# Patient Record
Sex: FEMALE | Race: OTHER | HISPANIC OR LATINO | URBAN - METROPOLITAN AREA
[De-identification: names, ages, dates, MRNs, and addresses within clinical notes are randomized per-mention and may not be internally consistent; named-entity substitution may affect disease eponyms.]

---

## 2021-07-27 ENCOUNTER — INPATIENT (INPATIENT)
Facility: HOSPITAL | Age: 71
LOS: 1 days | Discharge: HOME | DRG: 909 | End: 2021-07-29
Attending: HOSPITALIST | Admitting: HOSPITALIST
Payer: COMMERCIAL

## 2021-07-27 ENCOUNTER — TRANSCRIPTION ENCOUNTER (OUTPATIENT)
Age: 71
End: 2021-07-27

## 2021-07-27 VITALS
RESPIRATION RATE: 18 BRPM | WEIGHT: 163.14 LBS | TEMPERATURE: 98 F | HEIGHT: 62 IN | DIASTOLIC BLOOD PRESSURE: 82 MMHG | OXYGEN SATURATION: 97 % | SYSTOLIC BLOOD PRESSURE: 135 MMHG | HEART RATE: 93 BPM

## 2021-07-27 DIAGNOSIS — Z90.710 ACQUIRED ABSENCE OF BOTH CERVIX AND UTERUS: Chronic | ICD-10-CM

## 2021-07-27 DIAGNOSIS — S90.859A SUPERFICIAL FOREIGN BODY, UNSPECIFIED FOOT, INITIAL ENCOUNTER: ICD-10-CM

## 2021-07-27 DIAGNOSIS — I10 ESSENTIAL (PRIMARY) HYPERTENSION: ICD-10-CM

## 2021-07-27 DIAGNOSIS — Z01.818 ENCOUNTER FOR OTHER PREPROCEDURAL EXAMINATION: ICD-10-CM

## 2021-07-27 DIAGNOSIS — I25.10 ATHEROSCLEROTIC HEART DISEASE OF NATIVE CORONARY ARTERY WITHOUT ANGINA PECTORIS: ICD-10-CM

## 2021-07-27 DIAGNOSIS — Z71.89 OTHER SPECIFIED COUNSELING: ICD-10-CM

## 2021-07-27 DIAGNOSIS — E11.9 TYPE 2 DIABETES MELLITUS WITHOUT COMPLICATIONS: ICD-10-CM

## 2021-07-27 DIAGNOSIS — Z29.9 ENCOUNTER FOR PROPHYLACTIC MEASURES, UNSPECIFIED: ICD-10-CM

## 2021-07-27 LAB
ALBUMIN SERPL ELPH-MCNC: 3.9 G/DL — SIGNIFICANT CHANGE UP (ref 3.5–5)
ALP SERPL-CCNC: 114 U/L — SIGNIFICANT CHANGE UP (ref 40–120)
ALT FLD-CCNC: 25 U/L DA — SIGNIFICANT CHANGE UP (ref 10–60)
ANION GAP SERPL CALC-SCNC: 6 MMOL/L — SIGNIFICANT CHANGE UP (ref 5–17)
APTT BLD: 33.2 SEC — SIGNIFICANT CHANGE UP (ref 27.5–35.5)
AST SERPL-CCNC: 16 U/L — SIGNIFICANT CHANGE UP (ref 10–40)
BASOPHILS # BLD AUTO: 0.04 K/UL — SIGNIFICANT CHANGE UP (ref 0–0.2)
BASOPHILS NFR BLD AUTO: 0.5 % — SIGNIFICANT CHANGE UP (ref 0–2)
BILIRUB SERPL-MCNC: 0.3 MG/DL — SIGNIFICANT CHANGE UP (ref 0.2–1.2)
BLD GP AB SCN SERPL QL: SIGNIFICANT CHANGE UP
BUN SERPL-MCNC: 21 MG/DL — HIGH (ref 7–18)
CALCIUM SERPL-MCNC: 9.2 MG/DL — SIGNIFICANT CHANGE UP (ref 8.4–10.5)
CHLORIDE SERPL-SCNC: 105 MMOL/L — SIGNIFICANT CHANGE UP (ref 96–108)
CO2 SERPL-SCNC: 27 MMOL/L — SIGNIFICANT CHANGE UP (ref 22–31)
CREAT SERPL-MCNC: 1.2 MG/DL — SIGNIFICANT CHANGE UP (ref 0.5–1.3)
EOSINOPHIL # BLD AUTO: 0.18 K/UL — SIGNIFICANT CHANGE UP (ref 0–0.5)
EOSINOPHIL NFR BLD AUTO: 2.3 % — SIGNIFICANT CHANGE UP (ref 0–6)
GLUCOSE BLDC GLUCOMTR-MCNC: 118 MG/DL — HIGH (ref 70–99)
GLUCOSE BLDC GLUCOMTR-MCNC: 240 MG/DL — HIGH (ref 70–99)
GLUCOSE SERPL-MCNC: 236 MG/DL — HIGH (ref 70–99)
HCT VFR BLD CALC: 37 % — SIGNIFICANT CHANGE UP (ref 34.5–45)
HGB BLD-MCNC: 12.2 G/DL — SIGNIFICANT CHANGE UP (ref 11.5–15.5)
IMM GRANULOCYTES NFR BLD AUTO: 0.3 % — SIGNIFICANT CHANGE UP (ref 0–1.5)
INR BLD: 1.11 RATIO — SIGNIFICANT CHANGE UP (ref 0.88–1.16)
LYMPHOCYTES # BLD AUTO: 2.26 K/UL — SIGNIFICANT CHANGE UP (ref 1–3.3)
LYMPHOCYTES # BLD AUTO: 28.8 % — SIGNIFICANT CHANGE UP (ref 13–44)
MCHC RBC-ENTMCNC: 30.5 PG — SIGNIFICANT CHANGE UP (ref 27–34)
MCHC RBC-ENTMCNC: 33 GM/DL — SIGNIFICANT CHANGE UP (ref 32–36)
MCV RBC AUTO: 92.5 FL — SIGNIFICANT CHANGE UP (ref 80–100)
MONOCYTES # BLD AUTO: 0.52 K/UL — SIGNIFICANT CHANGE UP (ref 0–0.9)
MONOCYTES NFR BLD AUTO: 6.6 % — SIGNIFICANT CHANGE UP (ref 2–14)
NEUTROPHILS # BLD AUTO: 4.82 K/UL — SIGNIFICANT CHANGE UP (ref 1.8–7.4)
NEUTROPHILS NFR BLD AUTO: 61.5 % — SIGNIFICANT CHANGE UP (ref 43–77)
NRBC # BLD: 0 /100 WBCS — SIGNIFICANT CHANGE UP (ref 0–0)
PLATELET # BLD AUTO: 271 K/UL — SIGNIFICANT CHANGE UP (ref 150–400)
POTASSIUM SERPL-MCNC: 4.3 MMOL/L — SIGNIFICANT CHANGE UP (ref 3.5–5.3)
POTASSIUM SERPL-SCNC: 4.3 MMOL/L — SIGNIFICANT CHANGE UP (ref 3.5–5.3)
PROT SERPL-MCNC: 8.7 G/DL — HIGH (ref 6–8.3)
PROTHROM AB SERPL-ACNC: 13.1 SEC — SIGNIFICANT CHANGE UP (ref 10.6–13.6)
RBC # BLD: 4 M/UL — SIGNIFICANT CHANGE UP (ref 3.8–5.2)
RBC # FLD: 12.5 % — SIGNIFICANT CHANGE UP (ref 10.3–14.5)
SARS-COV-2 RNA SPEC QL NAA+PROBE: SIGNIFICANT CHANGE UP
SODIUM SERPL-SCNC: 138 MMOL/L — SIGNIFICANT CHANGE UP (ref 135–145)
WBC # BLD: 7.84 K/UL — SIGNIFICANT CHANGE UP (ref 3.8–10.5)
WBC # FLD AUTO: 7.84 K/UL — SIGNIFICANT CHANGE UP (ref 3.8–10.5)

## 2021-07-27 PROCEDURE — 99223 1ST HOSP IP/OBS HIGH 75: CPT | Mod: GC

## 2021-07-27 PROCEDURE — 73660 X-RAY EXAM OF TOE(S): CPT | Mod: 26,RT

## 2021-07-27 PROCEDURE — 99285 EMERGENCY DEPT VISIT HI MDM: CPT

## 2021-07-27 PROCEDURE — 71045 X-RAY EXAM CHEST 1 VIEW: CPT | Mod: 26

## 2021-07-27 RX ORDER — OXYCODONE AND ACETAMINOPHEN 5; 325 MG/1; MG/1
1 TABLET ORAL EVERY 6 HOURS
Refills: 0 | Status: DISCONTINUED | OUTPATIENT
Start: 2021-07-27 | End: 2021-07-29

## 2021-07-27 RX ORDER — LIDOCAINE HCL 20 MG/ML
20 VIAL (ML) INJECTION ONCE
Refills: 0 | Status: COMPLETED | OUTPATIENT
Start: 2021-07-27 | End: 2021-07-27

## 2021-07-27 RX ORDER — CEFAZOLIN SODIUM 1 G
1000 VIAL (EA) INJECTION ONCE
Refills: 0 | Status: COMPLETED | OUTPATIENT
Start: 2021-07-27 | End: 2021-07-27

## 2021-07-27 RX ORDER — INSULIN LISPRO 100/ML
VIAL (ML) SUBCUTANEOUS
Refills: 0 | Status: DISCONTINUED | OUTPATIENT
Start: 2021-07-27 | End: 2021-07-29

## 2021-07-27 RX ORDER — SIMVASTATIN 20 MG/1
20 TABLET, FILM COATED ORAL AT BEDTIME
Refills: 0 | Status: DISCONTINUED | OUTPATIENT
Start: 2021-07-27 | End: 2021-07-29

## 2021-07-27 RX ORDER — METOPROLOL TARTRATE 50 MG
25 TABLET ORAL DAILY
Refills: 0 | Status: DISCONTINUED | OUTPATIENT
Start: 2021-07-27 | End: 2021-07-29

## 2021-07-27 RX ORDER — GABAPENTIN 400 MG/1
300 CAPSULE ORAL DAILY
Refills: 0 | Status: DISCONTINUED | OUTPATIENT
Start: 2021-07-27 | End: 2021-07-29

## 2021-07-27 RX ORDER — INSULIN GLARGINE 100 [IU]/ML
12 INJECTION, SOLUTION SUBCUTANEOUS AT BEDTIME
Refills: 0 | Status: DISCONTINUED | OUTPATIENT
Start: 2021-07-27 | End: 2021-07-29

## 2021-07-27 RX ORDER — SODIUM CHLORIDE 9 MG/ML
1000 INJECTION INTRAMUSCULAR; INTRAVENOUS; SUBCUTANEOUS
Refills: 0 | Status: DISCONTINUED | OUTPATIENT
Start: 2021-07-27 | End: 2021-07-29

## 2021-07-27 RX ORDER — INSULIN LISPRO 100/ML
8 VIAL (ML) SUBCUTANEOUS ONCE
Refills: 0 | Status: DISCONTINUED | OUTPATIENT
Start: 2021-07-27 | End: 2021-07-28

## 2021-07-27 RX ORDER — TETANUS TOXOID, REDUCED DIPHTHERIA TOXOID AND ACELLULAR PERTUSSIS VACCINE, ADSORBED 5; 2.5; 8; 8; 2.5 [IU]/.5ML; [IU]/.5ML; UG/.5ML; UG/.5ML; UG/.5ML
0.5 SUSPENSION INTRAMUSCULAR ONCE
Refills: 0 | Status: COMPLETED | OUTPATIENT
Start: 2021-07-27 | End: 2021-07-27

## 2021-07-27 RX ORDER — ACETAMINOPHEN 500 MG
650 TABLET ORAL EVERY 6 HOURS
Refills: 0 | Status: DISCONTINUED | OUTPATIENT
Start: 2021-07-27 | End: 2021-07-29

## 2021-07-27 RX ORDER — ASPIRIN/CALCIUM CARB/MAGNESIUM 324 MG
81 TABLET ORAL DAILY
Refills: 0 | Status: DISCONTINUED | OUTPATIENT
Start: 2021-07-27 | End: 2021-07-29

## 2021-07-27 RX ORDER — LANOLIN ALCOHOL/MO/W.PET/CERES
3 CREAM (GRAM) TOPICAL AT BEDTIME
Refills: 0 | Status: DISCONTINUED | OUTPATIENT
Start: 2021-07-27 | End: 2021-07-29

## 2021-07-27 RX ORDER — CEFAZOLIN SODIUM 1 G
500 VIAL (EA) INJECTION EVERY 8 HOURS
Refills: 0 | Status: DISCONTINUED | OUTPATIENT
Start: 2021-07-27 | End: 2021-07-29

## 2021-07-27 RX ADMIN — Medication 20 MILLILITER(S): at 12:13

## 2021-07-27 RX ADMIN — Medication 1000 MILLIGRAM(S): at 14:30

## 2021-07-27 RX ADMIN — SODIUM CHLORIDE 75 MILLILITER(S): 9 INJECTION INTRAMUSCULAR; INTRAVENOUS; SUBCUTANEOUS at 20:35

## 2021-07-27 RX ADMIN — INSULIN GLARGINE 12 UNIT(S): 100 INJECTION, SOLUTION SUBCUTANEOUS at 23:00

## 2021-07-27 RX ADMIN — Medication 100 MILLIGRAM(S): at 23:00

## 2021-07-27 RX ADMIN — Medication 0: at 20:25

## 2021-07-27 RX ADMIN — GABAPENTIN 300 MILLIGRAM(S): 400 CAPSULE ORAL at 20:33

## 2021-07-27 RX ADMIN — TETANUS TOXOID, REDUCED DIPHTHERIA TOXOID AND ACELLULAR PERTUSSIS VACCINE, ADSORBED 0.5 MILLILITER(S): 5; 2.5; 8; 8; 2.5 SUSPENSION INTRAMUSCULAR at 13:34

## 2021-07-27 RX ADMIN — Medication 3 MILLIGRAM(S): at 23:00

## 2021-07-27 RX ADMIN — SIMVASTATIN 20 MILLIGRAM(S): 20 TABLET, FILM COATED ORAL at 23:21

## 2021-07-27 RX ADMIN — OXYCODONE AND ACETAMINOPHEN 1 TABLET(S): 5; 325 TABLET ORAL at 20:33

## 2021-07-27 RX ADMIN — Medication 100 MILLIGRAM(S): at 13:33

## 2021-07-27 NOTE — H&P ADULT - ATTENDING COMMENTS
69 y/o female with a PMH of DM type 2, HTN, CAD s/p stent 11 years ago, who presented with right foot pain after stepping on a sewing needle on Wednesday. She reports that she attempted to remove it herself but was unable to, then went to a podiatrist who was also unsuccessful, then went to [facility] where it also could not be removed, She was prescribed antibiotics with clindamycin and ciprofloxacin yesterday. Due to the ongoing pain and difficulty with ambulation she decided to come to [facility]. She was seen by podiatry in the ED, unable to remove the needle, therefore planning on taking patient to the OR tomorrow at 3:30 pm. Patient reports good ET, denies signs/symptoms of CHF, has CAD but no prior h/o MI or CVA.     Vital Signs Last 24 Hrs  T(C): 36.4 (27 Jul 2021 15:46), Max: 36.8 (27 Jul 2021 10:30)  T(F): 97.5 (27 Jul 2021 15:46), Max: 98.2 (27 Jul 2021 10:30)  HR: 83 (27 Jul 2021 15:46) (83 - 93)  BP: 128/80 (27 Jul 2021 15:46) (128/80 - 135/82)  BP(mean): --  RR: 18 (27 Jul 2021 15:46) (18 - 18)  SpO2: 99% (27 Jul 2021 15:46) (97% - 99%)    EXAM:  GEN: alert, in no acute distress  HEENT: normocephalic, atraumatic, eomi, perrl, mmm  CVS: regular rate and rhythm, normal s1/s2  RESP: clear bilaterally, no wheezing, rales, or ronchi  ABD: soft, nontender, nondistended, normoactive bowel sounds  : deferred  HEME: no bruising or bleeding  LYMPH NODES: no cervical or supraclavicular lymphadenopathy  SKIN: warm and dry  EXT: right foot in compression dressing (see podiatry note), left foot wnl  NEURO: AAOx3    LABS:                       12.2   7.84  )-----------( 271      ( 27 Jul 2021 13:51 )             37.0     07-27    138  |  105  |  21<H>  ----------------------------<  236<H>  4.3   |  27  |  1.20    Ca    9.2      27 Jul 2021 13:51    TPro  8.7<H>  /  Alb  3.9  /  TBili  0.3  /  DBili  x   /  AST  16  /  ALT  25  /  AlkPhos  114  07-27    LIVER FUNCTIONS - ( 27 Jul 2021 13:51 )  Alb: 3.9 g/dL / Pro: 8.7 g/dL / ALK PHOS: 114 U/L / ALT: 25 U/L DA / AST: 16 U/L / GGT: x             IMAGING: Reviewed    ASSESSMENT & PLAN:  69 y/o female with a PMH of DM type 2, HTN, CAD s/p stent 11 years ago, who presented with right foot pain after stepping on a sewing needle on Wednesday and admitted for foreign body removal.    -X-ray of right foot reviewed  -Podiatry planning to take patient to the OR tomorrow 7/28 at 3:30 PM. Requested pre-op evaluation  -Continue IV Ancef perioperatively  -Has an RCRI Score of 2, placing her at moderate risk for perioperative cardiac complications, however she has good exercise tolerance with a METS>4, labs reviewed, EKG NSR, CXR clear, no h/o of CHF, MI, CVA.   -No further pre-operative testing indicated, may proceed to the OR as per Podiatry's schedule  -Will keep NPO at midnight and place on gentle IVF  -Decrease Lantus to 12 units tonight (on 24 units at home)  -Can give 8 units of Lispro tonight prior to dinner but hold while NPO  -Hold home Metformin and start on SSI  -Continue home Metoprolol but hold home Ramipril and Chlorthalidone  -Continue home Aspirin and Statin  -Hold chemical DVT ppx until after procedure, will place on SCDs for now  -Will need PT eval post-operatively    Plan of care discussed w/ MAR/Medical Resident, Dr. Guzman

## 2021-07-27 NOTE — H&P ADULT - NSHPPHYSICALEXAM_GEN_ALL_CORE
ICU Vital Signs Last 24 Hrs  T(C): 36.4 (27 Jul 2021 15:46), Max: 36.8 (27 Jul 2021 10:30)  T(F): 97.5 (27 Jul 2021 15:46), Max: 98.2 (27 Jul 2021 10:30)  HR: 83 (27 Jul 2021 15:46) (83 - 93)  BP: 128/80 (27 Jul 2021 15:46) (128/80 - 135/82)  BP(mean): --  ABP: --  ABP(mean): --  RR: 18 (27 Jul 2021 15:46) (18 - 18)  SpO2: 99% (27 Jul 2021 15:46) (97% - 99%)

## 2021-07-27 NOTE — ED PROVIDER NOTE - IV ALTEPLASE EXCL ABS HIDDEN
Serum crt 2.8--> 1.8 off benazepril.  Bp < 140/90.  Discussed with daughter, continue to monitor bp suggest 130/80 as goal with aneurysm for nwGabrielle Bronson referral for f/u of naeurysm as  She was last seen in vascular 2015 and the size has increased 3.0--->3.9 cm., asymptomatic.  
show

## 2021-07-27 NOTE — ED PROVIDER NOTE - ATTENDING CONTRIBUTION TO CARE
I was physically present for the E/M service provided. I agree with above history, physical, and plan which I have reviewed and edited where appropriate. I was physically present for the key portions of the service provided.    Taylor: Pt is a 70 yr female with hx of diabetes & HTN with foot pain after stepping on a sewing needle yesterday. She reports visiting [facility] ED yesterday and had an x-ray showing a foreign body in the right first toe,    2.5cm laceration on plantar aspect of R great toe. ttp, minimal bleeding    a/p: retained foreign body of great toe. xr, pain meds, tetanus shot if not utd, continue abx from [facility]. podiatry consult

## 2021-07-27 NOTE — H&P ADULT - PROBLEM SELECTOR PLAN 5
- Patient's Revised Cardiac Risk Index (RCRI) score is 2 ( 10.1 % risk) . Patient is at 10.1 % risk of  adverse perioperative cardiac events undergoing moderate risk surgery( foreign body removal ) . METS > 4 ( can walk 10 blocks without SOB or chest pain ) , EKG showed NSR . No further cardiac testing is needed prior to patient's surgery.  - NPO after midnight   - PT/INR , Type and screenordered in AM

## 2021-07-27 NOTE — H&P ADULT - PROBLEM SELECTOR PLAN 2
- Patient has PMH of DM on basaglar 24 units and novologue 8 units TID  - Will hold home meds and start on sliding scale and lantus 12 units , increase dose as needed  - F/U A1C.

## 2021-07-27 NOTE — H&P ADULT - PROBLEM SELECTOR PLAN 3
- Patient has past history of HTN on metoprolol succinate, chlorthalidone and rimapril  - Resumed metoprolol w parameters , will hold other meds   - Monitor BP and resume meds as needed  - DASH diet

## 2021-07-27 NOTE — ED PROVIDER NOTE - OBJECTIVE STATEMENT
Pt is a 70 yof with hx of diabetes & HTN with foot pain after stepping on a sewing needle yesterday. She reports visiting [facility] ED yesterday and had an x-ray showing a foreign body in the right first toe, and states she was told to visit a different ED. She reports pain & tenderness to the R first toe. She was prescribed antibiotics at [place] yesterday, but she does not remember which one. Pt is also on insulin and hypertension medication, but she does not remember the medication names. Pt is a 70 yof with hx of diabetes & HTN with foot pain after stepping on a sewing needle yesterday. She reports visiting [facility] ED yesterday and had an x-ray showing a foreign body in the right first toe, and states she was told to visit a different ED. She reports pain & tenderness to the R first toe. She was prescribed antibiotics at [place] yesterday, but she does not remember which one. Pt is also on insulin and antihypertensive, but she does not remember the medication names. Pt is a 70 yr female with hx of diabetes & HTN with foot pain after stepping on a sewing needle yesterday. She reports visiting [facility] ED yesterday and had an x-ray showing a foreign body in the right first toe, and states she was told to visit a different ED. She reports pain & tenderness to the R first toe. She was prescribed antibiotics at [place] yesterday, but she does not remember which one. Pt is also on insulin and antihypertensive, but she does not remember the medication names.

## 2021-07-27 NOTE — ED PROVIDER NOTE - CLINICAL SUMMARY MEDICAL DECISION MAKING FREE TEXT BOX
Pt with report of stepping on a sewing needle with R first toe, lac consistent with injury and prior x-ray consistent with impacted foreign body. Repeat xray R foot in house and consult w/ podiatry for removal.

## 2021-07-27 NOTE — H&P ADULT - NSICDXPASTMEDICALHX_GEN_ALL_CORE_FT
PAST MEDICAL HISTORY:  Hypertension, unspecified type     Type 2 diabetes mellitus without complication, with long-term current use of insulin

## 2021-07-27 NOTE — H&P ADULT - PROBLEM SELECTOR PLAN 1
- Patient presented with pain in Rt foot after stepping on sewing needle.   - Xray of the right foot reviewed - pending final read     Foreign body noted on the right hallux - with distal tip of the needle penetrating the phalanx   - podaitry consulted and Attempted to remove the needle but was unsuccessful , Sutured the incision site with 4-0 polypropylene .  - Plan for removal of foreign body in the OR tomorrow (7/28) at 3:30 PM with Dr. Boswell  - given manipulation , will start on Anc perioperative

## 2021-07-27 NOTE — H&P ADULT - ASSESSMENT
70 yr female with PMH of diabetes ,HTN , CAD with stent 11 years ago presented with right foot pain after stepping on a sewing needle on wednesday . Admitted for foreign body removal.

## 2021-07-27 NOTE — H&P ADULT - HISTORY OF PRESENT ILLNESS
70 yr female with PMH of diabetes ,HTN , CAD with stent 11 years ago presented with right foot pain after stepping on a sewing needle on wednesday . She tried to remove it but wasnot able to do so and since then she has been having pain . She reports visiting [facility] ED yesterday and had an x-ray showing a foreign body in the right first toe, and states she was told to visit a different ED as they couldnot get the needle out . She was prescribed antibiotics at [place] yesterday( clindamycin ) ,  patient denies any headache, dizziness, chest pain, palpitations, shortness of breath, abdominal pain, nausea/vomiting/diarrhea or any other acute complaint.

## 2021-07-27 NOTE — ED PROVIDER NOTE - PMH
Hypertension, unspecified type    Type 2 diabetes mellitus without complication, with long-term current use of insulin

## 2021-07-27 NOTE — H&P ADULT - PROBLEM SELECTOR PLAN 6
RISK                                                          Points  [] Previous VTE                                           3  [] Thrombophilia                                        2  [] Lower limb paralysis                              2   [] Current Cancer                                       2   [] Immobilization > 24 hrs                        1  [] ICU/CCU stay > 24 hours                       1  [x] Age > 60                                                   1    compression device

## 2021-07-27 NOTE — ED ADULT NURSE NOTE - OBJECTIVE STATEMENT
As per pt, c/o R greater toe pain and mild swelling s/p having a sewing needle stuck in the toe x4 days ago. Pt admits to being treated and released at [facility] ER. Pt denies h/a, SOB, f/c, n/v/d, CP, radiating pain, or cough.

## 2021-07-27 NOTE — CONSULT NOTE ADULT - SUBJECTIVE AND OBJECTIVE BOX
Patient is a 70y old  Female who presents with a chief complaint of right foot pain     HPI: Pt is a 70 yr female with hx of diabetes & HTN with foot pain after stepping on a sewing needle yesterday. She reports visiting [facility] ED yesterday and had an x-ray showing a foreign body in the right first toe, and states she was told to visit a different ED. She reports pain & tenderness to the R first toe. She was prescribed antibiotics at [place] yesterday, but she does not remember which one. Pt is also on insulin and antihypertensive, but she does not remember the medication names.    Podiatry HPI: Podiatry consulted regarding 71 y/o female who presents with right foot pain. Patient reports that she stepped on a needle while sewing yesterday and it penetrated through her right hallux. Patient states that she was able to take a piece of the needle out and the remaining piece was too deep. She reports visiting [facility] yesterday and was told to go to another hospital because they couldn't get it out. She says she can't fully weight bear on her right foot and there is pain upon palpation. Patient denies constitutional symptoms of N/V/C/F/Sob       PMH: Hypertension, unspecified type    Type 2 diabetes mellitus without complication, unspecified whether long term insulin use    Type 2 diabetes mellitus without complication, with long-term current use of insulin      Allergies: No Known Allergies    Medications:   FH: No pertinent family history in first degree relatives      PSX: H/O total hysterectomy      SH: Social History:      Vital Signs Last 24 Hrs  T(C): 36.4 (27 Jul 2021 15:46), Max: 36.8 (27 Jul 2021 10:30)  T(F): 97.5 (27 Jul 2021 15:46), Max: 98.2 (27 Jul 2021 10:30)  HR: 83 (27 Jul 2021 15:46) (83 - 93)  BP: 128/80 (27 Jul 2021 15:46) (128/80 - 135/82)  BP(mean): --  RR: 18 (27 Jul 2021 15:46) (18 - 18)  SpO2: 99% (27 Jul 2021 15:46) (97% - 99%)    LABS                        12.2   7.84  )-----------( 271      ( 27 Jul 2021 13:51 )             37.0                07-27    138  |  105  |  21<H>  ----------------------------<  236<H>  4.3   |  27  |  1.20    Ca    9.2      27 Jul 2021 13:51    TPro  8.7<H>  /  Alb  3.9  /  TBili  0.3  /  DBili  x   /  AST  16  /  ALT  25  /  AlkPhos  114  07-27       WBC Count: 7.84 K/uL (07-27-21 @ 13:51)    PT/INR - ( 27 Jul 2021 13:51 )   PT: 13.1 sec;   INR: 1.11 ratio         PTT - ( 27 Jul 2021 13:51 )  PTT:33.2 sec    CAPILLARY BLOOD GLUCOSE        ROS  REVIEW OF SYSTEM:   All others are negative unless stated otherwise in the HPI        PHYSICAL EXAM  GEN: CHASE JC is a pleasant well-nourished, well developed 70y Female in no acute distress, alert awake, and oriented to person, place and time.   LE Focused:    Vasc:  DP/PT pulses palpable b/l, CFT brisk to all digits, TG wnl, mild edema localized to the right hallux, mild erythema localized to the right hallux   Derm: Previous incision noted on the plantar aspect of the right hallux measuring about 2-3cm - open incision,  no signs of infection, bandage was filled with blood   Neuro: Protective and epicritic sensation grossly intact b/l  MSK: Pain upon palpation of the right hallux, able to move extremities in all compartments         Imaging:   X ray of the right foot (7/27)  pending read

## 2021-07-27 NOTE — ED ADULT TRIAGE NOTE - CHIEF COMPLAINT QUOTE
c/o foreign body Rt big toe states stepped on a sewing needle x 4 days ago . seen yesterday  at [facility] ER

## 2021-07-27 NOTE — ED PROVIDER NOTE - LOCATION
2.5cm laceration on plantar aspect of R great toe. Needle not visible. Minimal bleeding after removal of bandage. Pulses, motor, sensory function normal in RLE./toe

## 2021-07-27 NOTE — ED ADULT NURSE NOTE - NSIMPLEMENTINTERV_GEN_ALL_ED
Implemented All Universal Safety Interventions:  [place] to call system. Call bell, personal items and telephone within reach. Instruct patient to call for assistance. Room bathroom lighting operational. Non-slip footwear when patient is off stretcher. Physically safe environment: no spills, clutter or unnecessary equipment. Stretcher in lowest position, wheels locked, appropriate side rails in place.

## 2021-07-27 NOTE — ED ADULT NURSE NOTE - CHIEF COMPLAINT QUOTE
c/o foreign body Rt big toe states stepped on a sewing needle x 4 days ago . seen yesterday  at [facility] ER

## 2021-07-28 ENCOUNTER — RESULT REVIEW (OUTPATIENT)
Age: 71
End: 2021-07-28

## 2021-07-28 DIAGNOSIS — S90.851A SUPERFICIAL FOREIGN BODY, RIGHT FOOT, INITIAL ENCOUNTER: ICD-10-CM

## 2021-07-28 DIAGNOSIS — I25.10 ATHEROSCLEROTIC HEART DISEASE OF NATIVE CORONARY ARTERY WITHOUT ANGINA PECTORIS: ICD-10-CM

## 2021-07-28 DIAGNOSIS — Z29.9 ENCOUNTER FOR PROPHYLACTIC MEASURES, UNSPECIFIED: ICD-10-CM

## 2021-07-28 DIAGNOSIS — Z01.818 ENCOUNTER FOR OTHER PREPROCEDURAL EXAMINATION: ICD-10-CM

## 2021-07-28 LAB
A1C WITH ESTIMATED AVERAGE GLUCOSE RESULT: 9.4 % — HIGH (ref 4–5.6)
ALBUMIN SERPL ELPH-MCNC: 3.2 G/DL — LOW (ref 3.5–5)
ALP SERPL-CCNC: 88 U/L — SIGNIFICANT CHANGE UP (ref 40–120)
ALT FLD-CCNC: 20 U/L DA — SIGNIFICANT CHANGE UP (ref 10–60)
ANION GAP SERPL CALC-SCNC: 9 MMOL/L — SIGNIFICANT CHANGE UP (ref 5–17)
AST SERPL-CCNC: 15 U/L — SIGNIFICANT CHANGE UP (ref 10–40)
BASOPHILS # BLD AUTO: 0.04 K/UL — SIGNIFICANT CHANGE UP (ref 0–0.2)
BASOPHILS NFR BLD AUTO: 0.5 % — SIGNIFICANT CHANGE UP (ref 0–2)
BILIRUB SERPL-MCNC: 0.4 MG/DL — SIGNIFICANT CHANGE UP (ref 0.2–1.2)
BLD GP AB SCN SERPL QL: SIGNIFICANT CHANGE UP
BUN SERPL-MCNC: 21 MG/DL — HIGH (ref 7–18)
CALCIUM SERPL-MCNC: 8.9 MG/DL — SIGNIFICANT CHANGE UP (ref 8.4–10.5)
CHLORIDE SERPL-SCNC: 107 MMOL/L — SIGNIFICANT CHANGE UP (ref 96–108)
CHOLEST SERPL-MCNC: 165 MG/DL — SIGNIFICANT CHANGE UP
CO2 SERPL-SCNC: 25 MMOL/L — SIGNIFICANT CHANGE UP (ref 22–31)
COVID-19 SPIKE DOMAIN AB INTERP: POSITIVE
COVID-19 SPIKE DOMAIN ANTIBODY RESULT: >250 U/ML — HIGH
CREAT SERPL-MCNC: 1.04 MG/DL — SIGNIFICANT CHANGE UP (ref 0.5–1.3)
EOSINOPHIL # BLD AUTO: 0.29 K/UL — SIGNIFICANT CHANGE UP (ref 0–0.5)
EOSINOPHIL NFR BLD AUTO: 3.9 % — SIGNIFICANT CHANGE UP (ref 0–6)
ESTIMATED AVERAGE GLUCOSE: 223 MG/DL — HIGH (ref 68–114)
GLUCOSE BLDC GLUCOMTR-MCNC: 108 MG/DL — HIGH (ref 70–99)
GLUCOSE BLDC GLUCOMTR-MCNC: 112 MG/DL — HIGH (ref 70–99)
GLUCOSE BLDC GLUCOMTR-MCNC: 135 MG/DL — HIGH (ref 70–99)
GLUCOSE BLDC GLUCOMTR-MCNC: 218 MG/DL — HIGH (ref 70–99)
GLUCOSE SERPL-MCNC: 124 MG/DL — HIGH (ref 70–99)
HCT VFR BLD CALC: 34.6 % — SIGNIFICANT CHANGE UP (ref 34.5–45)
HCV AB S/CO SERPL IA: 0.1 S/CO — SIGNIFICANT CHANGE UP (ref 0–0.99)
HCV AB SERPL-IMP: SIGNIFICANT CHANGE UP
HDLC SERPL-MCNC: 58 MG/DL — SIGNIFICANT CHANGE UP
HGB BLD-MCNC: 11.2 G/DL — LOW (ref 11.5–15.5)
IMM GRANULOCYTES NFR BLD AUTO: 0.3 % — SIGNIFICANT CHANGE UP (ref 0–1.5)
INR BLD: 1.14 RATIO — SIGNIFICANT CHANGE UP (ref 0.88–1.16)
LIPID PNL WITH DIRECT LDL SERPL: 85 MG/DL — SIGNIFICANT CHANGE UP
LYMPHOCYTES # BLD AUTO: 2.8 K/UL — SIGNIFICANT CHANGE UP (ref 1–3.3)
LYMPHOCYTES # BLD AUTO: 37.9 % — SIGNIFICANT CHANGE UP (ref 13–44)
MAGNESIUM SERPL-MCNC: 1.9 MG/DL — SIGNIFICANT CHANGE UP (ref 1.6–2.6)
MCHC RBC-ENTMCNC: 29.9 PG — SIGNIFICANT CHANGE UP (ref 27–34)
MCHC RBC-ENTMCNC: 32.4 GM/DL — SIGNIFICANT CHANGE UP (ref 32–36)
MCV RBC AUTO: 92.3 FL — SIGNIFICANT CHANGE UP (ref 80–100)
MONOCYTES # BLD AUTO: 0.66 K/UL — SIGNIFICANT CHANGE UP (ref 0–0.9)
MONOCYTES NFR BLD AUTO: 8.9 % — SIGNIFICANT CHANGE UP (ref 2–14)
NEUTROPHILS # BLD AUTO: 3.57 K/UL — SIGNIFICANT CHANGE UP (ref 1.8–7.4)
NEUTROPHILS NFR BLD AUTO: 48.5 % — SIGNIFICANT CHANGE UP (ref 43–77)
NON HDL CHOLESTEROL: 107 MG/DL — SIGNIFICANT CHANGE UP
NRBC # BLD: 0 /100 WBCS — SIGNIFICANT CHANGE UP (ref 0–0)
PHOSPHATE SERPL-MCNC: 3.8 MG/DL — SIGNIFICANT CHANGE UP (ref 2.5–4.5)
PLATELET # BLD AUTO: 234 K/UL — SIGNIFICANT CHANGE UP (ref 150–400)
POTASSIUM SERPL-MCNC: 3.8 MMOL/L — SIGNIFICANT CHANGE UP (ref 3.5–5.3)
POTASSIUM SERPL-SCNC: 3.8 MMOL/L — SIGNIFICANT CHANGE UP (ref 3.5–5.3)
PROT SERPL-MCNC: 7.2 G/DL — SIGNIFICANT CHANGE UP (ref 6–8.3)
PROTHROM AB SERPL-ACNC: 13.5 SEC — SIGNIFICANT CHANGE UP (ref 10.6–13.6)
RBC # BLD: 3.75 M/UL — LOW (ref 3.8–5.2)
RBC # FLD: 12.5 % — SIGNIFICANT CHANGE UP (ref 10.3–14.5)
SARS-COV-2 IGG+IGM SERPL QL IA: >250 U/ML — HIGH
SARS-COV-2 IGG+IGM SERPL QL IA: POSITIVE
SODIUM SERPL-SCNC: 141 MMOL/L — SIGNIFICANT CHANGE UP (ref 135–145)
TRIGL SERPL-MCNC: 108 MG/DL — SIGNIFICANT CHANGE UP
TSH SERPL-MCNC: 4.91 UU/ML — HIGH (ref 0.34–4.82)
WBC # BLD: 7.38 K/UL — SIGNIFICANT CHANGE UP (ref 3.8–10.5)
WBC # FLD AUTO: 7.38 K/UL — SIGNIFICANT CHANGE UP (ref 3.8–10.5)

## 2021-07-28 PROCEDURE — 88300 SURGICAL PATH GROSS: CPT | Mod: 26

## 2021-07-28 PROCEDURE — 99233 SBSQ HOSP IP/OBS HIGH 50: CPT | Mod: GC

## 2021-07-28 RX ORDER — LISINOPRIL 2.5 MG/1
40 TABLET ORAL DAILY
Refills: 0 | Status: DISCONTINUED | OUTPATIENT
Start: 2021-07-29 | End: 2021-07-29

## 2021-07-28 RX ADMIN — Medication 100 MILLIGRAM(S): at 05:54

## 2021-07-28 RX ADMIN — INSULIN GLARGINE 12 UNIT(S): 100 INJECTION, SOLUTION SUBCUTANEOUS at 22:06

## 2021-07-28 RX ADMIN — Medication 100 MILLIGRAM(S): at 22:06

## 2021-07-28 RX ADMIN — Medication 650 MILLIGRAM(S): at 22:00

## 2021-07-28 RX ADMIN — Medication 100 MILLIGRAM(S): at 13:12

## 2021-07-28 RX ADMIN — Medication 3 MILLIGRAM(S): at 22:06

## 2021-07-28 RX ADMIN — Medication 650 MILLIGRAM(S): at 22:18

## 2021-07-28 RX ADMIN — SIMVASTATIN 20 MILLIGRAM(S): 20 TABLET, FILM COATED ORAL at 22:06

## 2021-07-28 RX ADMIN — Medication 25 MILLIGRAM(S): at 05:54

## 2021-07-28 NOTE — PROGRESS NOTE ADULT - SUBJECTIVE AND OBJECTIVE BOX
Podiatry interval: Patient seen resting in bed AAOx3. Podiatry following for foreign body in the right hallux. Pt reports pain around the right hallux. She is amenable for surgery this afternoon. Patient denies any acute over night events. Denies constitutional symptoms of N/V/C/F/Sob.     Patient is a 70y old  Female who presents with a chief complaint of right foot pain     HPI: Pt is a 70 yr female with hx of diabetes & HTN with foot pain after stepping on a sewing needle yesterday. She reports visiting [facility] ED yesterday and had an x-ray showing a foreign body in the right first toe, and states she was told to visit a different ED. She reports pain & tenderness to the R first toe. She was prescribed antibiotics at [place] yesterday, but she does not remember which one. Pt is also on insulin and antihypertensive, but she does not remember the medication names.    Podiatry HPI: Podiatry consulted regarding 71 y/o female who presents with right foot pain. Patient reports that she stepped on a needle while sewing yesterday and it penetrated through her right hallux. Patient states that she was able to take a piece of the needle out and the remaining piece was too deep. She reports visiting [facility] yesterday and was told to go to another hospital because they couldn't get it out. She says she can't fully weight bear on her right foot and there is pain upon palpation. Patient denies constitutional symptoms of N/V/C/F/Sob       Medications acetaminophen   Tablet .. 650 milliGRAM(s) Oral every 6 hours PRN  aspirin enteric coated 81 milliGRAM(s) Oral daily  ceFAZolin   IVPB 500 milliGRAM(s) IV Intermittent every 8 hours  gabapentin 300 milliGRAM(s) Oral daily PRN  insulin glargine Injectable (LANTUS) 12 Unit(s) SubCutaneous at bedtime  insulin lispro (ADMELOG) corrective regimen sliding scale   SubCutaneous three times a day before meals  melatonin 3 milliGRAM(s) Oral at bedtime PRN  metoprolol succinate ER 25 milliGRAM(s) Oral daily  oxycodone    5 mG/acetaminophen 325 mG 1 Tablet(s) Oral every 6 hours PRN  simvastatin 20 milliGRAM(s) Oral at bedtime  sodium chloride 0.9%. 1000 milliLiter(s) IV Continuous <Continuous>    FH: No pertinent family history in first degree relatives    ,   PMH: Hypertension, unspecified type    Type 2 diabetes mellitus without complication, unspecified whether long term insulin use    Type 2 diabetes mellitus without complication, with long-term current use of insulin       PSH: H/O total hysterectomy        Labs                          11.2   7.38  )-----------( 234      ( 28 Jul 2021 06:19 )             34.6      07-28    141  |  107  |  21<H>  ----------------------------<  124<H>  3.8   |  25  |  1.04    Ca    8.9      28 Jul 2021 06:19  Phos  3.8     07-28  Mg     1.9     07-28    TPro  7.2  /  Alb  3.2<L>  /  TBili  0.4  /  DBili  x   /  AST  15  /  ALT  20  /  AlkPhos  88  07-28     Vital Signs Last 24 Hrs  T(C): 36.3 (28 Jul 2021 05:17), Max: 36.7 (27 Jul 2021 21:05)  T(F): 97.4 (28 Jul 2021 05:17), Max: 98.1 (27 Jul 2021 21:05)  HR: 69 (28 Jul 2021 05:17) (69 - 94)  BP: 110/71 (28 Jul 2021 05:17) (110/71 - 136/87)  BP(mean): --  RR: 18 (28 Jul 2021 05:17) (18 - 18)  SpO2: 97% (28 Jul 2021 05:17) (97% - 99%)          WBC Count: 7.38 K/uL (07-28-21 @ 06:19)  WBC Count: 7.84 K/uL (07-27-21 @ 13:51)    PT/INR - ( 28 Jul 2021 06:19 )   PT: 13.5 sec;   INR: 1.14 ratio         PTT - ( 27 Jul 2021 13:51 )  PTT:33.2 sec    CAPILLARY BLOOD GLUCOSE      POCT Blood Glucose.: 135 mg/dL (28 Jul 2021 08:17)  POCT Blood Glucose.: 240 mg/dL (27 Jul 2021 22:39)  POCT Blood Glucose.: 118 mg/dL (27 Jul 2021 20:20)      ROS: All others negative unless otherwise stated in the HPI          PHYSICAL EXAM  GEN: CHASE JC is a pleasant well-nourished, well developed 70y Female in no acute distress, alert awake, and oriented to person, place and time.   LE Focused:    Vasc:  DP/PT pulses palpable b/l, CFT brisk to all digits, TG wnl, mild edema localized to the right hallux, mild erythema localized to the right hallux   Derm: Previous incision noted on the plantar aspect of the right hallux measuring about 2-3cm - open incision,  no signs of infection, bandage was filled with blood   Neuro: Protective and epicritic sensation grossly intact b/l  MSK: Pain upon palpation of the right hallux, able to move extremities in all compartments         Imaging:   X ray of the right foot (7/27)  pending read

## 2021-07-28 NOTE — PROGRESS NOTE ADULT - SUBJECTIVE AND OBJECTIVE BOX
PGY-1 Progress Note discussed with attending    PAGER #: [[phone]] TILL 5:00 PM  PLEASE CONTACT ON CALL TEAM:  - On Call Team (Please refer to Юлия) FROM 5:00 PM - 8:30PM  - Nightfloat Team FROM 8:30 -7:30 AM    CHIEF COMPLAINT & BRIEF HOSPITAL COURSE:    Patient is a 70 yr female with PMH of diabetes, HTN, CAD with stent (2010) presented with right foot pain after stepping on a sewing needle last week (7/21/21). She tried to remove it but was not able to remove it entirely. She reports visiting [facility] ED yesterday and had an x-ray showing a foreign body in the right first toe. She was told to visit a different ED as they could not get the needle out. She was prescribed antibiotics at [place] yesterday (Clindamycin), Patient denies any headache, dizziness, chest pain, palpitations, shortness of breath, abdominal pain, nausea/vomiting/diarrhea or any other acute complaint.     Admitted to [facility]. Seen by Podiatry (Dr. Boswell) and scheduled for foreign body removal (7/28/21).    INTERVAL HPI/OVERNIGHT EVENTS:     Pt. examined at bedside. Speaks in Greenlandic. We were able to speak with her daughter (Deyanira), who gladly served as . AAOx3, stable. Complains of occ. mild headaches relieved by Tylenol. Scheduled for surgery today. No other significant events overnight.      REVIEW OF SYSTEMS:  CONSTITUTIONAL: No fever, weight loss, or fatigue  RESPIRATORY: No cough, wheezing, chills or hemoptysis; No shortness of breath  CARDIOVASCULAR: No chest pain, palpitations, dizziness, or leg swelling  GASTROINTESTINAL: No abdominal pain. No nausea, vomiting, or hematemesis; No diarrhea or constipation. No melena or hematochezia.  GENITOURINARY: No dysuria or hematuria, urinary frequency  NEUROLOGICAL: (+) headaches, No memory loss, loss of strength, numbness, or tremors  SKIN: No itching, burning, rashes, or lesions   EXTREMITIES: (+) pain, R foot, (+) dressing, No leg swelling, edema, limitation of movement    MEDICATIONS  (STANDING):  aspirin enteric coated 81 milliGRAM(s) Oral daily  ceFAZolin   IVPB 500 milliGRAM(s) IV Intermittent every 8 hours  insulin glargine Injectable (LANTUS) 12 Unit(s) SubCutaneous at bedtime  insulin lispro (ADMELOG) corrective regimen sliding scale   SubCutaneous three times a day before meals  metoprolol succinate ER 25 milliGRAM(s) Oral daily  simvastatin 20 milliGRAM(s) Oral at bedtime  sodium chloride 0.9%. 1000 milliLiter(s) (75 mL/Hr) IV Continuous <Continuous>    MEDICATIONS  (PRN):  acetaminophen   Tablet .. 650 milliGRAM(s) Oral every 6 hours PRN Temp greater or equal to 38.5C (101.3F), Mild Pain (1 - 3)  gabapentin 300 milliGRAM(s) Oral daily PRN leg pain  melatonin 3 milliGRAM(s) Oral at bedtime PRN Insomnia  oxycodone    5 mG/acetaminophen 325 mG 1 Tablet(s) Oral every 6 hours PRN Severe Pain (7 - 10)      Vital Signs Last 24 Hrs  T(C): 36.3 (28 Jul 2021 05:17), Max: 36.8 (27 Jul 2021 10:30)  T(F): 97.4 (28 Jul 2021 05:17), Max: 98.2 (27 Jul 2021 10:30)  HR: 69 (28 Jul 2021 05:17) (69 - 94)  BP: 110/71 (28 Jul 2021 05:17) (110/71 - 136/87)  BP(mean): --  RR: 18 (28 Jul 2021 05:17) (18 - 18)  SpO2: 97% (28 Jul 2021 05:17) (97% - 99%)    PHYSICAL EXAMINATION:  GENERAL: NAD, well built  HEAD:  Atraumatic, Normocephalic  EYES:  conjunctiva and sclera clear  NECK: Supple, No JVD, Normal thyroid  CHEST/LUNG: Clear to auscultation. Clear to percussion bilaterally; No rales, rhonchi, wheezing, or rubs  HEART: Regular rate and rhythm; No murmurs, rubs, or gallops  ABDOMEN: Soft, Nontender, Nondistended; Bowel sounds present, no pain or masses on palpation  NERVOUS SYSTEM:  Alert & Oriented X3  : voiding well  EXTREMITIES:  2+ Peripheral Pulses, No clubbing, cyanosis, or edema  SKIN: warm dry                          11.2   7.38  )-----------( 234      ( 28 Jul 2021 06:19 )             34.6     07-28    141  |  107  |  21<H>  ----------------------------<  124<H>  3.8   |  25  |  1.04    Ca    8.9      28 Jul 2021 06:19  Phos  3.8     07-28  Mg     1.9     07-28    TPro  7.2  /  Alb  3.2<L>  /  TBili  0.4  /  DBili  x   /  AST  15  /  ALT  20  /  AlkPhos  88  07-28    LIVER FUNCTIONS - ( 28 Jul 2021 06:19 )  Alb: 3.2 g/dL / Pro: 7.2 g/dL / ALK PHOS: 88 U/L / ALT: 20 U/L DA / AST: 15 U/L / GGT: x               PT/INR - ( 28 Jul 2021 06:19 )   PT: 13.5 sec;   INR: 1.14 ratio         PTT - ( 27 Jul 2021 13:51 )  PTT:33.2 sec    I&O's Summary          CAPILLARY BLOOD GLUCOSE      RADIOLOGY & ADDITIONAL TESTS:

## 2021-07-28 NOTE — PROGRESS NOTE ADULT - PROBLEM SELECTOR PLAN 6
Problem: DVT prophylaxis. Plan: RISK        Points  [] Previous VTE                                           3  [] Thrombophilia                                        2  [] Lower limb paralysis                               2   [] Current Cancer                                       2   [] Immobilization > 24 hrs                          1  [] ICU/CCU stay > 24 hours                        1  [x] Age > 60                                             1    compression device  start Lovenox 40 u SQ post-op

## 2021-07-29 ENCOUNTER — TRANSCRIPTION ENCOUNTER (OUTPATIENT)
Age: 71
End: 2021-07-29

## 2021-07-29 VITALS
HEART RATE: 78 BPM | SYSTOLIC BLOOD PRESSURE: 107 MMHG | DIASTOLIC BLOOD PRESSURE: 67 MMHG | TEMPERATURE: 98 F | OXYGEN SATURATION: 97 % | RESPIRATION RATE: 18 BRPM

## 2021-07-29 DIAGNOSIS — R79.89 OTHER SPECIFIED ABNORMAL FINDINGS OF BLOOD CHEMISTRY: ICD-10-CM

## 2021-07-29 LAB
ANION GAP SERPL CALC-SCNC: 11 MMOL/L — SIGNIFICANT CHANGE UP (ref 5–17)
BASOPHILS # BLD AUTO: 0.03 K/UL — SIGNIFICANT CHANGE UP (ref 0–0.2)
BASOPHILS NFR BLD AUTO: 0.4 % — SIGNIFICANT CHANGE UP (ref 0–2)
BUN SERPL-MCNC: 18 MG/DL — SIGNIFICANT CHANGE UP (ref 7–18)
CALCIUM SERPL-MCNC: 9.3 MG/DL — SIGNIFICANT CHANGE UP (ref 8.4–10.5)
CHLORIDE SERPL-SCNC: 103 MMOL/L — SIGNIFICANT CHANGE UP (ref 96–108)
CO2 SERPL-SCNC: 25 MMOL/L — SIGNIFICANT CHANGE UP (ref 22–31)
CREAT SERPL-MCNC: 1.13 MG/DL — SIGNIFICANT CHANGE UP (ref 0.5–1.3)
EOSINOPHIL # BLD AUTO: 0.26 K/UL — SIGNIFICANT CHANGE UP (ref 0–0.5)
EOSINOPHIL NFR BLD AUTO: 3.1 % — SIGNIFICANT CHANGE UP (ref 0–6)
GLUCOSE BLDC GLUCOMTR-MCNC: 151 MG/DL — HIGH (ref 70–99)
GLUCOSE BLDC GLUCOMTR-MCNC: 217 MG/DL — HIGH (ref 70–99)
GLUCOSE BLDC GLUCOMTR-MCNC: 269 MG/DL — HIGH (ref 70–99)
GLUCOSE SERPL-MCNC: 156 MG/DL — HIGH (ref 70–99)
HCT VFR BLD CALC: 35.1 % — SIGNIFICANT CHANGE UP (ref 34.5–45)
HGB BLD-MCNC: 11.7 G/DL — SIGNIFICANT CHANGE UP (ref 11.5–15.5)
IMM GRANULOCYTES NFR BLD AUTO: 0.4 % — SIGNIFICANT CHANGE UP (ref 0–1.5)
LYMPHOCYTES # BLD AUTO: 2.83 K/UL — SIGNIFICANT CHANGE UP (ref 1–3.3)
LYMPHOCYTES # BLD AUTO: 33.8 % — SIGNIFICANT CHANGE UP (ref 13–44)
MAGNESIUM SERPL-MCNC: 1.9 MG/DL — SIGNIFICANT CHANGE UP (ref 1.6–2.6)
MCHC RBC-ENTMCNC: 30.2 PG — SIGNIFICANT CHANGE UP (ref 27–34)
MCHC RBC-ENTMCNC: 33.3 GM/DL — SIGNIFICANT CHANGE UP (ref 32–36)
MCV RBC AUTO: 90.5 FL — SIGNIFICANT CHANGE UP (ref 80–100)
MONOCYTES # BLD AUTO: 0.62 K/UL — SIGNIFICANT CHANGE UP (ref 0–0.9)
MONOCYTES NFR BLD AUTO: 7.4 % — SIGNIFICANT CHANGE UP (ref 2–14)
NEUTROPHILS # BLD AUTO: 4.6 K/UL — SIGNIFICANT CHANGE UP (ref 1.8–7.4)
NEUTROPHILS NFR BLD AUTO: 54.9 % — SIGNIFICANT CHANGE UP (ref 43–77)
NRBC # BLD: 0 /100 WBCS — SIGNIFICANT CHANGE UP (ref 0–0)
PHOSPHATE SERPL-MCNC: 3.8 MG/DL — SIGNIFICANT CHANGE UP (ref 2.5–4.5)
PLATELET # BLD AUTO: 268 K/UL — SIGNIFICANT CHANGE UP (ref 150–400)
POTASSIUM SERPL-MCNC: 3.8 MMOL/L — SIGNIFICANT CHANGE UP (ref 3.5–5.3)
POTASSIUM SERPL-SCNC: 3.8 MMOL/L — SIGNIFICANT CHANGE UP (ref 3.5–5.3)
RBC # BLD: 3.88 M/UL — SIGNIFICANT CHANGE UP (ref 3.8–5.2)
RBC # FLD: 12.2 % — SIGNIFICANT CHANGE UP (ref 10.3–14.5)
SODIUM SERPL-SCNC: 139 MMOL/L — SIGNIFICANT CHANGE UP (ref 135–145)
T4 FREE SERPL-MCNC: 1.3 NG/DL — SIGNIFICANT CHANGE UP (ref 0.9–1.8)
TSH SERPL-MCNC: 6.47 UU/ML — HIGH (ref 0.34–4.82)
WBC # BLD: 8.37 K/UL — SIGNIFICANT CHANGE UP (ref 3.8–10.5)
WBC # FLD AUTO: 8.37 K/UL — SIGNIFICANT CHANGE UP (ref 3.8–10.5)

## 2021-07-29 PROCEDURE — 99239 HOSP IP/OBS DSCHRG MGMT >30: CPT | Mod: GC

## 2021-07-29 PROCEDURE — 73630 X-RAY EXAM OF FOOT: CPT | Mod: 26,RT

## 2021-07-29 RX ORDER — ACETAMINOPHEN 500 MG
650 TABLET ORAL EVERY 6 HOURS
Refills: 0 | Status: DISCONTINUED | OUTPATIENT
Start: 2021-07-29 | End: 2021-07-29

## 2021-07-29 RX ORDER — SIMVASTATIN 20 MG/1
20 TABLET, FILM COATED ORAL AT BEDTIME
Refills: 0 | Status: DISCONTINUED | OUTPATIENT
Start: 2021-07-29 | End: 2021-07-29

## 2021-07-29 RX ORDER — METOPROLOL TARTRATE 50 MG
25 TABLET ORAL DAILY
Refills: 0 | Status: DISCONTINUED | OUTPATIENT
Start: 2021-07-29 | End: 2021-07-29

## 2021-07-29 RX ORDER — OXYCODONE AND ACETAMINOPHEN 5; 325 MG/1; MG/1
1 TABLET ORAL EVERY 6 HOURS
Refills: 0 | Status: DISCONTINUED | OUTPATIENT
Start: 2021-07-29 | End: 2021-07-29

## 2021-07-29 RX ORDER — GABAPENTIN 400 MG/1
300 CAPSULE ORAL DAILY
Refills: 0 | Status: DISCONTINUED | OUTPATIENT
Start: 2021-07-29 | End: 2021-07-29

## 2021-07-29 RX ORDER — ASPIRIN/CALCIUM CARB/MAGNESIUM 324 MG
81 TABLET ORAL DAILY
Refills: 0 | Status: DISCONTINUED | OUTPATIENT
Start: 2021-07-29 | End: 2021-07-29

## 2021-07-29 RX ORDER — LANOLIN ALCOHOL/MO/W.PET/CERES
3 CREAM (GRAM) TOPICAL AT BEDTIME
Refills: 0 | Status: DISCONTINUED | OUTPATIENT
Start: 2021-07-29 | End: 2021-07-29

## 2021-07-29 RX ORDER — CEFAZOLIN SODIUM 1 G
500 VIAL (EA) INJECTION EVERY 8 HOURS
Refills: 0 | Status: DISCONTINUED | OUTPATIENT
Start: 2021-07-29 | End: 2021-07-29

## 2021-07-29 RX ORDER — ENOXAPARIN SODIUM 100 MG/ML
40 INJECTION SUBCUTANEOUS DAILY
Refills: 0 | Status: DISCONTINUED | OUTPATIENT
Start: 2021-07-29 | End: 2021-07-29

## 2021-07-29 RX ORDER — ACETAMINOPHEN 500 MG
2 TABLET ORAL
Qty: 240 | Refills: 0
Start: 2021-07-29 | End: 2021-08-27

## 2021-07-29 RX ORDER — LISINOPRIL 2.5 MG/1
40 TABLET ORAL DAILY
Refills: 0 | Status: DISCONTINUED | OUTPATIENT
Start: 2021-07-29 | End: 2021-07-29

## 2021-07-29 RX ADMIN — OXYCODONE AND ACETAMINOPHEN 1 TABLET(S): 5; 325 TABLET ORAL at 13:54

## 2021-07-29 RX ADMIN — ENOXAPARIN SODIUM 40 MILLIGRAM(S): 100 INJECTION SUBCUTANEOUS at 12:11

## 2021-07-29 RX ADMIN — LISINOPRIL 40 MILLIGRAM(S): 2.5 TABLET ORAL at 05:58

## 2021-07-29 RX ADMIN — Medication 2: at 12:11

## 2021-07-29 RX ADMIN — GABAPENTIN 300 MILLIGRAM(S): 400 CAPSULE ORAL at 07:29

## 2021-07-29 RX ADMIN — Medication 25 MILLIGRAM(S): at 05:58

## 2021-07-29 RX ADMIN — OXYCODONE AND ACETAMINOPHEN 1 TABLET(S): 5; 325 TABLET ORAL at 00:36

## 2021-07-29 RX ADMIN — Medication 81 MILLIGRAM(S): at 12:12

## 2021-07-29 RX ADMIN — Medication 1: at 08:07

## 2021-07-29 RX ADMIN — Medication 100 MILLIGRAM(S): at 06:22

## 2021-07-29 RX ADMIN — OXYCODONE AND ACETAMINOPHEN 1 TABLET(S): 5; 325 TABLET ORAL at 01:29

## 2021-07-29 RX ADMIN — OXYCODONE AND ACETAMINOPHEN 1 TABLET(S): 5; 325 TABLET ORAL at 07:29

## 2021-07-29 RX ADMIN — OXYCODONE AND ACETAMINOPHEN 1 TABLET(S): 5; 325 TABLET ORAL at 08:10

## 2021-07-29 RX ADMIN — Medication 100 MILLIGRAM(S): at 13:54

## 2021-07-29 NOTE — PROGRESS NOTE ADULT - PROBLEM SELECTOR PLAN 6
elevated TSH  f/u Thyroid panel elevated TSH  thyroid not enlarged  No Hx of thyroid disease  f/u Thyroid panel

## 2021-07-29 NOTE — PROGRESS NOTE ADULT - PROBLEM SELECTOR PLAN 7
Problem: DVT prophylaxis. Plan: RISK        Points  [] Previous VTE                                           3  [] Thrombophilia                                        2  [] Lower limb paralysis                               2   [] Current Cancer                                       2   [] Immobilization > 24 hrs                          1  [] ICU/CCU stay > 24 hours                        1  [x] Age > 60                                             1    started Lovenox 40 u SQ post-op

## 2021-07-29 NOTE — PROGRESS NOTE ADULT - PROBLEM SELECTOR PLAN 3
Patient has past history of HTN  - chlorthalidone and ramipril - hold (consider resume post-op)  - Resumed metoprolol w parameters , will hold other meds  - Monitor BP and resume meds as needed  - DASH diet.
Patient has past history of HTN  - chlorthalidone and ramipril - hold (consider resume post-op)  - Resumed metoprolol w parameters , will hold other meds  - Monitor BP and resume meds as needed  - DASH diet.

## 2021-07-29 NOTE — DISCHARGE NOTE PROVIDER - NSDCCPCAREPLAN_GEN_ALL_CORE_FT
PRINCIPAL DISCHARGE DIAGNOSIS  Diagnosis: Foreign body in foot  Assessment and Plan of Treatment:        PRINCIPAL DISCHARGE DIAGNOSIS  Diagnosis: Foreign body in foot  Assessment and Plan of Treatment: You were admitted due to having a foreign body in the great toe of your right foot after stepping on a sewing needle. You attempted to remove the whole thing but a fragment remained inside your toe. Xray for your R foot showed Foreign body noted on the right hallux - with distal tip of the needle penetrating the phalanx. Podiatry was consulted. They attempted to remove it at the ED but failed. You were given antibiotics, Ancef perioperative as a prophylaxis. You underwent operaive procedure for removal of foreign body which was noted to be inside your tendon. The operation was successful. Wound dressing was done. She was advised to keep the wound dressing intact and dry until her follow-up with Podiatry (Dr. Boswell) on August 4, 2021. You will be on antibiotics (Augmentin) for the next 7 days to prevent infection. You were advised to avoid weight bearing and wear surgical shoes. You were also seen by Physical Therapy who recommended gait training and rolling walker 1-2 per week. You have no need as of this moment for a skilled physical therapy.        SECONDARY DISCHARGE DIAGNOSES  Diagnosis: Type 2 diabetes mellitus without complication, with long-term current use of insulin  Assessment and Plan of Treatment: Your HA1c was found in admission to be 9.4. You were have Basaglar KwikPen 100 u/mL 24 units at bedtime and Novolog FlexPen 100 u/mL 8 units before meals 3times per day. While admitted, you were placed on Lantus 12 units at bedtime and Lispro 8 units before meals. Please resume your full dose of home meds and follow-up with your Endocrinologist.   .  You must maintain a healthy diet that consist of low sugar, low fat, low sodium diet. Exercise frequently if possible. Consider repeating your Hemoglobin A1c within 3 months after discharge to monitor your average blood glucose control. Follow up with primary care physician in one week after discharge. Follow up with Primary Care Physician within one week after discharge to inform of your recent hospitalization. Patient/Family was oriented on instruction.    Diagnosis: Hypertension, unspecified type  Assessment and Plan of Treatment:   You  have Hypertension and Coronary Artery Disease. You were on Metoprolol Succinate 25 mg daily, Ramipril 10 mg daily, Chlorthalidone 25 mg daily and Aspirin 81 mg daily. We temporarily halted your Ramipril and Chlorthalidone to prevent hypotension during the operation. We continued your Metoprolol and Aspirin while you were admitted.  Please hold Chlorthalidone until Monday 8/2/2021, and continue  with rest of your Blood Pressure medications after discharge at home.    Diagnosis: TSH elevation  Assessment and Plan of Treatment:   Your Thyroid Stimulating Hormone (TSH) was mildly elevated (6.47). You denied any past and family history of thyroid disease. Thyroid exam is not enlarged and non-nodular. We advised you to follow-up with your Primary Doctor regarding this. Follow up with Primary Care Physician within one week after discharge to inform of your recent hospitalization. Patient/Family was oriented on instruction.

## 2021-07-29 NOTE — PHYSICAL THERAPY INITIAL EVALUATION ADULT - ACTIVE RANGE OF MOTION EXAMINATION, REHAB EVAL
Right hip and Knee-WNL. Right ankle/foot reduced due to bandage/pain/shantal. upper extremity Active ROM was WNL (within normal limits)/LLE Active ROM was WNL (within normal limits)

## 2021-07-29 NOTE — DISCHARGE NOTE NURSING/CASE MANAGEMENT/SOCIAL WORK - PATIENT PORTAL LINK FT
You can access the FollowMyHealth Patient Portal offered by [facility] by registering at the following website: [email or web address]. By joining [employer]’s FollowMyHealth portal, you will also be able to view your health information using other applications (apps) compatible with our system.

## 2021-07-29 NOTE — DISCHARGE NOTE PROVIDER - HOSPITAL COURSE
Patient is a 70 yr female with PMH of diabetes, HTN, CAD with stent (2010) presented with right foot pain after stepping on a sewing needle last week (7/21/21). She tried to remove it but was not able to remove it entirely. She reports visiting [facility] ED yesterday and had an x-ray showing a foreign body in the right first toe. She was told to visit a different ED as they could not get the needle out. She was prescribed antibiotics at [facility] yesterday (Clindamycin), Patient denies any headache, dizziness, chest pain, palpitations, shortness of breath, abdominal pain, nausea/vomiting/diarrhea or any other acute complaint.     Admitted to [facility]. Seen by Podiatry (Dr. Boswell) and had surgical procedure for foreign body removal (7/28/21). Surgical note found half-retained needle in tendon. Glucose was also monitored and insulin was adjusted accordingly. Had elevated TSH. Monitor thyroid levels as outpatient. PT consult and Nutrition consult done.    You were admitted due to having a foreign body in the great toe of your right foot after stepping on a sewing needle. You attempted to remove the whole thing but a fragment remained inside your toe. Xray for your R foot showed Foreign body noted on the right hallux - with distal tip of the needle penetrating the phalanx. Podiatry was consulted. They attempted to remove it at the ED but failed. You were given antibiotics, Ancef perioperative as a prophylaxis. You underwent operaive procedure for removal of foreign body which was noted to be inside your tendon. The operation was successful. Wound dressing was done. She was advised to keep the wound dressing intact and dry until her follow-up with Podiatry (Dr. Boswell) on August 4, 2021. You will be on antibiotics (Augmentin) for the next 10 days to prevent infection. You were advised to avoid weight bearing and wear surgical shoes. You were also seen by Physical Therapy who recommended gait training and rolling walker 1-2x/week. You have no need as of this moment for a skilled PT.    You also have Type 2 diabetes mellitus. Your Hemoglobin A1c is 9.4.  You were have Basaglar KwikPen 100 u/mL 24 units at bedtime and Novolog FlexPen 100 u/mL 8 units before meals 3x/day. While admitted, you were placed on Lantus 12 units at bedtime and Lispro 8 units before meals. You may resume your full dose of home meds and follow-up with your Endocrinologist.    You also have Hypertension and Coronary Artery Disease. You were on Metoprolol Succinate 25 mg daily, Ramipril 10 mg daily, Chlorthalidone 25 mg daily and Aspirin 81 mg daily. We temporarily halted your Ramipril and Chlorthalidone to prevent hypotension during the operation. We continued your Metoprolol and Aspirin while you were admitted.  You can resume all three of your Blood Pressure medications at home.    Your Thyroid Stimulating Hormone (TSH) was mildly elevated (6.47). You denied any past and family history of thyroid disease. Thyroid exam is not enlarged and non-nodular. We advised you to follow-up with your Primary Doctor regarding this.     Patient is a 70 yr female with PMH of diabetes, HTN, CAD with stent (2010) presented with right foot pain after stepping on a sewing needle last week (7/21/21). She tried to remove it but was not able to remove it entirely. She reports visiting [facility] ED yesterday and had an x-ray showing a foreign body in the right first toe. She was told to visit a different ED as they could not get the needle out. She was prescribed antibiotics at [facility] yesterday (Clindamycin), Patient denies any headache, dizziness, chest pain, palpitations, shortness of breath, abdominal pain, nausea/vomiting/diarrhea or any other acute complaint.     Admitted to [facility]. Seen by Podiatry (Dr. Boswell) and had surgical procedure for foreign body removal (7/28/21). Surgical note found half-retained needle in tendon. Glucose was also monitored and insulin was adjusted accordingly. Had elevated TSH. Monitor thyroid levels as outpatient. PT consult and Nutrition consult done.  Patient was given antibiotics, Ancef perioperative as a prophylaxis. Patient advised to keep the wound dressing intact and dry until her follow-up with Podiatry (Dr. Boswell) on August 4, 2021. Patient to be on antibiotics (Augmentin) for extra 7 days to prevent infection after discharge  (total of 10 days antibiotic). patient advised to avoid weight bearing and wear surgical shoes. patient also seen by Physical Therapy who recommended gait training and rolling walker 1-2 per week.

## 2021-07-29 NOTE — PROGRESS NOTE ADULT - PROBLEM SELECTOR PLAN 5
Patient's Revised Cardiac Risk Index (RCRI) score is 2 ( 10.1 % risk) . Patient is at 10.1 % risk of  adverse perioperative cardiac events undergoing moderate risk surgery( foreign body removal ) . METS > 4 ( can walk 10 blocks without SOB or chest pain ) , EKG showed NSR . No further cardiac testing is needed prior to patient's surgery.  - NPO after midnight   - PT/INR , Type and screen (done)
Patient's Revised Cardiac Risk Index (RCRI) score is 2 ( 10.1 % risk) . Patient is at 10.1 % risk of  adverse perioperative cardiac events undergoing moderate risk surgery( foreign body removal ) . METS > 4 ( can walk 10 blocks without SOB or chest pain ) , EKG showed NSR . No further cardiac testing is needed prior to patient's surgery.  - NPO after midnight   - PT/INR , Type and screen (done)  - s/p foreign body removal (7/28/21)

## 2021-07-29 NOTE — DISCHARGE NOTE PROVIDER - ATTENDING COMMENTS
71 y/o female with a PMH of DM type 2, HTN, CAD s/p stent 11 years ago, who presented with right foot pain after stepping on a sewing needle on Wednesday and admitted for foreign body removal with Podiatry, went to the OR on 7/28 with successful removal. Was on IV Ancef perioperatively, podiatry recommended Augmentin to complete a total of 10 days. Is now back on diet, therefore resume home insulin regimen. Continue home Metoprolol and Lisinopril in place of Ramipril while admitted, continue to hold Chlorthalidone for a few days. Continue home Aspirin and Statin. PT recommended home with walker. Will discharge home with outpatient podiatry follow up.       Discharge time spent: 35 minutes

## 2021-07-29 NOTE — PROGRESS NOTE ADULT - ASSESSMENT
Assessment  Foreign body right foot   Right foot pain       Plan  Patient evaluated and chart created  Dressing removed and wound evaluated   Flushed the wound with saline and dressed the right foot with DSD   Patient is aware of the surgery this afternoon   Patient has been NPO since midnight   Plan for removal of foreign body and washout in the OR today at 3:45 PM with Dr. Boswell  Appreciate medical clearance   Written consent obtained and signed with witness present and placed in patient's chart   Continue NPO status   Covid negative 7/27  Discussed with Dr. Boswell     
Assessment  Foreign body right foot   Right foot pain       Plan  Patient evaluated and chart created  Dressing removed and wound evaluated   Flushed the wound with saline and dressed the right foot with adaptic, DSD   Patient denies pain. No clinical signs of infection noted   Continue antibiotics per ID/primary care   Stable from podiatry standpoint   Patient to be heel weightbearing to surgical site in surgical shoe   Patient is to follow up in 1 week with Dr. Boswell outpatient   Patient is to keep dressing clean, dry, and intact until follow up visit   Discussed and evaluated bedside with Dr. Boswell     
70 yr female with PMH of diabetes ,HTN , CAD with stent 11 years ago presented with right foot pain after stepping on a sewing needle on wednesday . Admitted for foreign body removal (7/28/21)
70 yr female with PMH of diabetes ,HTN , CAD with stent 11 years ago presented with right foot pain after stepping on a sewing needle on wednesday . Admitted for foreign body removal (7/28/21)

## 2021-07-29 NOTE — PROGRESS NOTE ADULT - PROBLEM SELECTOR PLAN 4
- Patient has history of CAD with stent 11 years ago   - Resumed home meds.
- Patient has history of CAD with stent 11 years ago   - Resumed home meds.

## 2021-07-29 NOTE — PHYSICAL THERAPY INITIAL EVALUATION ADULT - PERTINENT HX OF CURRENT PROBLEM, REHAB EVAL
PMH of diabetes ,HTN , CAD with stent 11 years ago presented with right foot pain after stepping on a sewing needle on Wednesday. s/p sx for removal of foreign body .

## 2021-07-29 NOTE — DISCHARGE NOTE NURSING/CASE MANAGEMENT/SOCIAL WORK - NSDCVIVACCINE_GEN_ALL_CORE_FT
Tdap; 27-Jul-2021 13:34; Shyann Moreno (RN); [employer]; [id number] (Exp. Date: 28-Jan-2023); IntraMuscular; Deltoid Left.; 0.5 milliLiter(s); VIS (VIS Published: 09-May-2013, VIS Presented: 27-Jul-2021);

## 2021-07-29 NOTE — PROGRESS NOTE ADULT - PROBLEM SELECTOR PLAN 2
PMH of DM on basaglar 24 units and novologue 8 units TID  - Will hold home meds and start on sliding scale and lantus 12 units , increase dose as needed  - resumed Lantus 12 u and Lispro 8 u post-op  - check POCT  - A1C - 9.4
PMH of DM on basaglar 24 units and novologue 8 units TID  - Will hold home meds and start on sliding scale and lantus 12 units , increase dose as needed  - resume Lantus 24 u and Lispro 8 u post-op  - check POCT  - A1C - 9.4

## 2021-07-29 NOTE — PROGRESS NOTE ADULT - SUBJECTIVE AND OBJECTIVE BOX
Podiatry interval: Patient seen resting in bed AAOx3. S/p right great toe foreign body removal. Pt reports mild pain to right great toe. Patient denies any acute over night events. Denies constitutional symptoms of N/V/C/F/Sob.     Patient is a 70y old  Female who presents with a chief complaint of right foot pain s/p right hallux foreign body removal 7/28.    HPI: Pt is a 70 yr female with hx of diabetes & HTN with foot pain after stepping on a sewing needle yesterday. She reports visiting [facility] ED yesterday and had an x-ray showing a foreign body in the right first toe, and states she was told to visit a different ED. She reports pain & tenderness to the R first toe. She was prescribed antibiotics at [facility] yesterday, but she does not remember which one. Pt is also on insulin and antihypertensive, but she does not remember the medication names.    Podiatry HPI: Podiatry consulted regarding 71 y/o female who presents with right foot pain. Patient reports that she stepped on a needle while sewing yesterday and it penetrated through her right hallux. Patient states that she was able to take a piece of the needle out and the remaining piece was too deep. She reports visiting [facility] yesterday and was told to go to another hospital because they couldn't get it out. She says she can't fully weight bear on her right foot and there is pain upon palpation. Patient denies constitutional symptoms of N/V/C/F/Sob     Medications acetaminophen   Tablet .. 650 milliGRAM(s) Oral every 6 hours PRN  aspirin enteric coated 81 milliGRAM(s) Oral daily  ceFAZolin   IVPB 500 milliGRAM(s) IV Intermittent every 8 hours  enoxaparin Injectable 40 milliGRAM(s) SubCutaneous daily  gabapentin 300 milliGRAM(s) Oral daily PRN  insulin glargine Injectable (LANTUS) 12 Unit(s) SubCutaneous at bedtime  insulin lispro (ADMELOG) corrective regimen sliding scale   SubCutaneous three times a day before meals  lisinopril 40 milliGRAM(s) Oral daily  melatonin 3 milliGRAM(s) Oral at bedtime PRN  metoprolol succinate ER 25 milliGRAM(s) Oral daily  oxycodone    5 mG/acetaminophen 325 mG 1 Tablet(s) Oral every 6 hours PRN  simvastatin 20 milliGRAM(s) Oral at bedtime  sodium chloride 0.9%. 1000 milliLiter(s) IV Continuous <Continuous>    FHNo pertinent family history in first degree relatives    ,   PMHHypertension, unspecified type    Type 2 diabetes mellitus without complication, unspecified whether long term insulin use    Type 2 diabetes mellitus without complication, with long-term current use of insulin       PSHH/O total hysterectomy        Labs                          11.7   8.37  )-----------( 268      ( 29 Jul 2021 07:18 )             35.1      07-29    139  |  103  |  18  ----------------------------<  156<H>  3.8   |  25  |  1.13    Ca    9.3      29 Jul 2021 07:18  Phos  3.8     07-29  Mg     1.9     07-29    TPro  7.2  /  Alb  3.2<L>  /  TBili  0.4  /  DBili  x   /  AST  15  /  ALT  20  /  AlkPhos  88  07-28     Vital Signs Last 24 Hrs  T(C): 36.6 (29 Jul 2021 14:21), Max: 36.9 (29 Jul 2021 05:03)  T(F): 97.9 (29 Jul 2021 14:21), Max: 98.4 (29 Jul 2021 05:03)  HR: 78 (29 Jul 2021 14:21) (67 - 97)  BP: 107/67 (29 Jul 2021 14:21) (107/67 - 136/66)  BP(mean): 79 (28 Jul 2021 19:07) (72 - 85)  RR: 18 (29 Jul 2021 14:21) (13 - 20)  SpO2: 97% (29 Jul 2021 14:21) (96% - 100%)          WBC Count: 8.37 K/uL (07-29-21 @ 07:18)          PHYSICAL EXAM  GEN: CHASE JC is a pleasant well-nourished, well developed 70y Female in no acute distress, alert awake, and oriented to person, place and time.   LE Focused:    Vasc:  DP/PT pulses palpable b/l, CFT brisk to all digits, TG wnl, mild edema localized to the right hallux, mild erythema localized to the right hallux   Derm: Previous incision noted on the plantar aspect of the right hallux measuring about 2-3cm - open incision,  no signs of infection, bandage was filled with blood   Neuro: Protective and epicritic sensation grossly intact b/l  MSK: Pain upon palpation of the right hallux, able to move extremities in all compartments         Imaging:   X ray of the right foot (7/27)  Xray results (7/29):  The bones and joint spaces are intact.  No fracture or dislocation.  Postoperative first toe soft tissue swelling. No radiopaque foreign body.                 Podiatry interval: Patient seen resting in bed AAOx3. S/p right great toe foreign body removal. Pt reports mild pain to right great toe. Patient denies any acute over night events. Denies constitutional symptoms of N/V/C/F/Sob.     Patient is a 70y old  Female who presents with a chief complaint of right foot pain s/p right hallux foreign body removal 7/28.    HPI: Pt is a 70 yr female with hx of diabetes & HTN with foot pain after stepping on a sewing needle yesterday. She reports visiting [facility] ED yesterday and had an x-ray showing a foreign body in the right first toe, and states she was told to visit a different ED. She reports pain & tenderness to the R first toe. She was prescribed antibiotics at [facility] yesterday, but she does not remember which one. Pt is also on insulin and antihypertensive, but she does not remember the medication names.    Podiatry HPI: Podiatry consulted regarding 69 y/o female who presents with right foot pain. Patient reports that she stepped on a needle while sewing yesterday and it penetrated through her right hallux. Patient states that she was able to take a piece of the needle out and the remaining piece was too deep. She reports visiting [facility] yesterday and was told to go to another hospital because they couldn't get it out. She says she can't fully weight bear on her right foot and there is pain upon palpation. Patient denies constitutional symptoms of N/V/C/F/Sob     Medications acetaminophen   Tablet .. 650 milliGRAM(s) Oral every 6 hours PRN  aspirin enteric coated 81 milliGRAM(s) Oral daily  ceFAZolin   IVPB 500 milliGRAM(s) IV Intermittent every 8 hours  enoxaparin Injectable 40 milliGRAM(s) SubCutaneous daily  gabapentin 300 milliGRAM(s) Oral daily PRN  insulin glargine Injectable (LANTUS) 12 Unit(s) SubCutaneous at bedtime  insulin lispro (ADMELOG) corrective regimen sliding scale   SubCutaneous three times a day before meals  lisinopril 40 milliGRAM(s) Oral daily  melatonin 3 milliGRAM(s) Oral at bedtime PRN  metoprolol succinate ER 25 milliGRAM(s) Oral daily  oxycodone    5 mG/acetaminophen 325 mG 1 Tablet(s) Oral every 6 hours PRN  simvastatin 20 milliGRAM(s) Oral at bedtime  sodium chloride 0.9%. 1000 milliLiter(s) IV Continuous <Continuous>    FHNo pertinent family history in first degree relatives    ,   PMHHypertension, unspecified type    Type 2 diabetes mellitus without complication, unspecified whether long term insulin use    Type 2 diabetes mellitus without complication, with long-term current use of insulin       PSHH/O total hysterectomy        Labs                          11.7   8.37  )-----------( 268      ( 29 Jul 2021 07:18 )             35.1      07-29    139  |  103  |  18  ----------------------------<  156<H>  3.8   |  25  |  1.13    Ca    9.3      29 Jul 2021 07:18  Phos  3.8     07-29  Mg     1.9     07-29    TPro  7.2  /  Alb  3.2<L>  /  TBili  0.4  /  DBili  x   /  AST  15  /  ALT  20  /  AlkPhos  88  07-28     Vital Signs Last 24 Hrs  T(C): 36.6 (29 Jul 2021 14:21), Max: 36.9 (29 Jul 2021 05:03)  T(F): 97.9 (29 Jul 2021 14:21), Max: 98.4 (29 Jul 2021 05:03)  HR: 78 (29 Jul 2021 14:21) (67 - 97)  BP: 107/67 (29 Jul 2021 14:21) (107/67 - 136/66)  BP(mean): 79 (28 Jul 2021 19:07) (72 - 85)  RR: 18 (29 Jul 2021 14:21) (13 - 20)  SpO2: 97% (29 Jul 2021 14:21) (96% - 100%)          WBC Count: 8.37 K/uL (07-29-21 @ 07:18)          PHYSICAL EXAM  GEN: CHASE JC is a pleasant well-nourished, well developed 70y Female in no acute distress, alert awake, and oriented to person, place and time.   LE Focused:    Vasc:  DP/PT pulses palpable b/l, CFT brisk to all digits, TG wnl, mild edema localized to the right hallux, mild erythema localized to the right hallux   Derm: Previous incision noted on the plantar aspect of the right hallux measuring about 2-3cm - open incision,  no signs of infection, bandage was filled with blood   Neuro: Protective and epicritic sensation grossly intact b/l  MSK: Pain upon palpation of the right hallux, able to move extremities in all compartments         Imaging:   X ray of the right foot (7/27):   There is a linear metallic pin-like foreign body measuring 8.2 mm in length underlying plantar surface of the first distal phalanx base. There is soft tissue swelling. Osseous structures intact.      Xray results (7/29): Post-op  The bones and joint spaces are intact.  No fracture or dislocation.  Postoperative first toe soft tissue swelling. No radiopaque foreign body.

## 2021-07-29 NOTE — PROGRESS NOTE ADULT - PROBLEM SELECTOR PLAN 1
Rt foot pain after stepping on sewing needle.   - Xray of the R foot reviewed - pending final read     Foreign body noted on the right hallux - with distal tip of the needle penetrating the phalanx   - Podiatry consult   - Attempted removal but unsuccessful, Sutured the incision site with 4-0 polypropylene .  - removal of foreign body in the OR today (7/28) at 3:30 PM with Dr. Boswell  - given manipulation, will start on Ancef perioperative  - f/u PT eval
Rt foot pain after stepping on sewing needle.   - Xray of the R foot reviewed - pending final read     Foreign body noted on the right hallux - with distal tip of the needle penetrating the phalanx   - Podiatry consult   - Attempted removal but unsuccessful, Sutured the incision site with 4-0 polypropylene .  - s/p removal of foreign body in the OR  - given manipulation, will start on Ancef perioperative  - f/u PT eval

## 2021-07-29 NOTE — PHYSICAL THERAPY INITIAL EVALUATION ADULT - CRITERIA FOR SKILLED THERAPEUTIC INTERVENTIONS
therapy frequency/predicted duration of therapy intervention/anticipated equipment needs at discharge/anticipated discharge recommendation

## 2021-07-29 NOTE — DISCHARGE NOTE PROVIDER - NSDCMRMEDTOKEN_GEN_ALL_CORE_FT
aspirin 81 mg oral delayed release tablet: 1 tab(s) orally once a day  Basaglar KwikPen 100 units/mL subcutaneous solution: 24 unit(s) subcutaneous once a day (at bedtime)  chlorthalidone 25 mg oral tablet: 1 tab(s) orally once a day  gabapentin 300 mg oral capsule: 1 cap(s) orally once a day, As Needed  meloxicam 7.5 mg oral tablet: 1 tab(s) orally once a day  metFORMIN 500 mg oral tablet: 1 tab(s) orally 2 times a day  metoprolol succinate 25 mg oral tablet, extended release: 1 tab(s) orally once a day  NovoLOG FlexPen 100 units/mL injectable solution: 8 unit(s) injectable 3 times a day (before meals)  ramipril 10 mg oral capsule: 1 cap(s) orally once a day  simvastatin 20 mg oral tablet: 1 tab(s) orally once a day (at bedtime)   acetaminophen 325 mg oral tablet: 2 tab(s) orally every 6 hours, As needed, Temp greater or equal to 38.5C (101.3F), Mild Pain (1 - 3)  aspirin 81 mg oral delayed release tablet: 1 tab(s) orally once a day  Augmentin 875 mg-125 mg oral tablet: 1 milligram(s) orally 2 times a day   Basaglar KwikPen 100 units/mL subcutaneous solution: 24 unit(s) subcutaneous once a day (at bedtime)  gabapentin 300 mg oral capsule: 1 cap(s) orally once a day, As Needed  meloxicam 7.5 mg oral tablet: 1 tab(s) orally once a day  metFORMIN 500 mg oral tablet: 1 tab(s) orally 2 times a day  metoprolol succinate 25 mg oral tablet, extended release: 1 tab(s) orally once a day  NovoLOG FlexPen 100 units/mL injectable solution: 8 unit(s) injectable 3 times a day (before meals)  ramipril 10 mg oral capsule: 1 cap(s) orally once a day  simvastatin 20 mg oral tablet: 1 tab(s) orally once a day (at bedtime)

## 2021-07-29 NOTE — PROGRESS NOTE ADULT - SUBJECTIVE AND OBJECTIVE BOX
PGY-1 Progress Note discussed with attending    PAGER #: [[phone]] TILL 5:00 PM  PLEASE CONTACT ON CALL TEAM:  - On Call Team (Please refer to Юлия) FROM 5:00 PM - 8:30PM  - Nightfloat Team FROM 8:30 -7:30 AM    CHIEF COMPLAINT & BRIEF HOSPITAL COURSE:    Patient is a 70 yr female with PMH of diabetes, HTN, CAD with stent (2010) presented with right foot pain after stepping on a sewing needle last week (7/21/21). She tried to remove it but was not able to remove it entirely. She reports visiting [facility] ED yesterday and had an x-ray showing a foreign body in the right first toe. She was told to visit a different ED as they could not get the needle out. She was prescribed antibiotics at [place] yesterday (Clindamycin), Patient denies any headache, dizziness, chest pain, palpitations, shortness of breath, abdominal pain, nausea/vomiting/diarrhea or any other acute complaint.     Admitted to [facility]. Seen by Podiatry (Dr. Boswell) and had surgical procedure for foreign body removal (7/28/21). Glucose was monitored and insulin was adjusted accordingly. Had elevated TSH.    INTERVAL HPI/OVERNIGHT EVENTS:     Pt. examined at bedside, awake, alert and stable. S/p removal of foreign body, R great toe. Complains of 9/10 pain  at the surgical site. No bleeding, discharge, swelling. No significant events overnight.    REVIEW OF SYSTEMS:  CONSTITUTIONAL: No fever, weight loss, or fatigue  RESPIRATORY: No cough, wheezing, chills or hemoptysis; No shortness of breath  CARDIOVASCULAR: No chest pain, palpitations, dizziness, or leg swelling  GASTROINTESTINAL: No abdominal pain. No nausea, vomiting, or hematemesis; No diarrhea or constipation. No melena or hematochezia.  GENITOURINARY: No dysuria or hematuria, urinary frequency  NEUROLOGICAL: No headaches, memory loss, loss of strength, numbness, or tremors  SKIN: No itching, burning, rashes, or lesions     MEDICATIONS  (STANDING):  aspirin enteric coated 81 milliGRAM(s) Oral daily  ceFAZolin   IVPB 500 milliGRAM(s) IV Intermittent every 8 hours  enoxaparin Injectable 40 milliGRAM(s) SubCutaneous daily  insulin glargine Injectable (LANTUS) 12 Unit(s) SubCutaneous at bedtime  insulin lispro (ADMELOG) corrective regimen sliding scale   SubCutaneous three times a day before meals  lisinopril 40 milliGRAM(s) Oral daily  metoprolol succinate ER 25 milliGRAM(s) Oral daily  simvastatin 20 milliGRAM(s) Oral at bedtime  sodium chloride 0.9%. 1000 milliLiter(s) (75 mL/Hr) IV Continuous <Continuous>    MEDICATIONS  (PRN):  acetaminophen   Tablet .. 650 milliGRAM(s) Oral every 6 hours PRN Temp greater or equal to 38.5C (101.3F), Mild Pain (1 - 3)  gabapentin 300 milliGRAM(s) Oral daily PRN leg pain  melatonin 3 milliGRAM(s) Oral at bedtime PRN Insomnia  oxycodone    5 mG/acetaminophen 325 mG 1 Tablet(s) Oral every 6 hours PRN Severe Pain (7 - 10)      Vital Signs Last 24 Hrs  T(C): 36.9 (29 Jul 2021 05:03), Max: 36.9 (29 Jul 2021 05:03)  T(F): 98.4 (29 Jul 2021 05:03), Max: 98.4 (29 Jul 2021 05:03)  HR: 89 (29 Jul 2021 05:03) (67 - 97)  BP: 124/87 (29 Jul 2021 05:03) (119/71 - 136/66)  BP(mean): 79 (28 Jul 2021 19:07) (72 - 85)  RR: 18 (29 Jul 2021 05:03) (13 - 20)  SpO2: 96% (29 Jul 2021 05:03) (96% - 100%)    PHYSICAL EXAMINATION:  GENERAL: NAD, well built  HEAD:  Atraumatic, Normocephalic  EYES:  conjunctiva and sclera clear  NECK: Supple, No JVD, Normal thyroid  CHEST/LUNG: Clear to auscultation. Clear to percussion bilaterally; No rales, rhonchi, wheezing, or rubs  HEART: Regular rate and rhythm; No murmurs, rubs, or gallops  ABDOMEN: Soft, Nontender, Nondistended; Bowel sounds present, no pain or masses on palpation  NERVOUS SYSTEM:  Alert & Oriented X3  : voiding well  EXTREMITIES:  2+ Peripheral Pulses, No clubbing, cyanosis, or edema  SKIN: warm dry                          11.7   8.37  )-----------( 268      ( 29 Jul 2021 07:18 )             35.1     07-29    139  |  103  |  18  ----------------------------<  156<H>  3.8   |  25  |  1.13    Ca    9.3      29 Jul 2021 07:18  Phos  3.8     07-29  Mg     1.9     07-29    TPro  7.2  /  Alb  3.2<L>  /  TBili  0.4  /  DBili  x   /  AST  15  /  ALT  20  /  AlkPhos  88  07-28    LIVER FUNCTIONS - ( 28 Jul 2021 06:19 )  Alb: 3.2 g/dL / Pro: 7.2 g/dL / ALK PHOS: 88 U/L / ALT: 20 U/L DA / AST: 15 U/L / GGT: x               PT/INR - ( 28 Jul 2021 06:19 )   PT: 13.5 sec;   INR: 1.14 ratio         PTT - ( 27 Jul 2021 13:51 )  PTT:33.2 sec    I&O's Summary          CAPILLARY BLOOD GLUCOSE      RADIOLOGY & ADDITIONAL TESTS:                   PGY-1 Progress Note discussed with attending    PAGER #: [[phone]] TILL 5:00 PM  PLEASE CONTACT ON CALL TEAM:  - On Call Team (Please refer to Юлия) FROM 5:00 PM - 8:30PM  - Nightfloat Team FROM 8:30 -7:30 AM    CHIEF COMPLAINT & BRIEF HOSPITAL COURSE:    Patient is a 70 yr female with PMH of diabetes, HTN, CAD with stent (2010) presented with right foot pain after stepping on a sewing needle last week (7/21/21). She tried to remove it but was not able to remove it entirely. She reports visiting [facility] ED yesterday and had an x-ray showing a foreign body in the right first toe. She was told to visit a different ED as they could not get the needle out. She was prescribed antibiotics at [place] yesterday (Clindamycin), Patient denies any headache, dizziness, chest pain, palpitations, shortness of breath, abdominal pain, nausea/vomiting/diarrhea or any other acute complaint.     Admitted to [facility]. Seen by Podiatry (Dr. Boswell) and had surgical procedure for foreign body removal (7/28/21). Glucose was monitored and insulin was adjusted accordingly. Had elevated TSH.    INTERVAL HPI/OVERNIGHT EVENTS:     Pt. examined at bedside, awake, alert and stable. S/p removal of foreign body, R great toe. Complains of 9/10 pain  at the surgical site. No bleeding, discharge, swelling. No significant events overnight.    REVIEW OF SYSTEMS:  CONSTITUTIONAL: No fever, weight loss, or fatigue  RESPIRATORY: No cough, wheezing, chills or hemoptysis; No shortness of breath  CARDIOVASCULAR: No chest pain, palpitations, dizziness, or leg swelling  GASTROINTESTINAL: No abdominal pain. No nausea, vomiting, or hematemesis; No diarrhea or constipation. No melena or hematochezia.  GENITOURINARY: No dysuria or hematuria, urinary frequency  NEUROLOGICAL: No headaches, memory loss, loss of strength, numbness, or tremors  SKIN: No itching, burning, rashes, or lesions  EXTREMITIES: (+) R foot pain (s/p foreign body removal)    MEDICATIONS  (STANDING):  aspirin enteric coated 81 milliGRAM(s) Oral daily  ceFAZolin   IVPB 500 milliGRAM(s) IV Intermittent every 8 hours  enoxaparin Injectable 40 milliGRAM(s) SubCutaneous daily  insulin glargine Injectable (LANTUS) 12 Unit(s) SubCutaneous at bedtime  insulin lispro (ADMELOG) corrective regimen sliding scale   SubCutaneous three times a day before meals  lisinopril 40 milliGRAM(s) Oral daily  metoprolol succinate ER 25 milliGRAM(s) Oral daily  simvastatin 20 milliGRAM(s) Oral at bedtime  sodium chloride 0.9%. 1000 milliLiter(s) (75 mL/Hr) IV Continuous <Continuous>    MEDICATIONS  (PRN):  acetaminophen   Tablet .. 650 milliGRAM(s) Oral every 6 hours PRN Temp greater or equal to 38.5C (101.3F), Mild Pain (1 - 3)  gabapentin 300 milliGRAM(s) Oral daily PRN leg pain  melatonin 3 milliGRAM(s) Oral at bedtime PRN Insomnia  oxycodone    5 mG/acetaminophen 325 mG 1 Tablet(s) Oral every 6 hours PRN Severe Pain (7 - 10)      Vital Signs Last 24 Hrs  T(C): 36.9 (29 Jul 2021 05:03), Max: 36.9 (29 Jul 2021 05:03)  T(F): 98.4 (29 Jul 2021 05:03), Max: 98.4 (29 Jul 2021 05:03)  HR: 89 (29 Jul 2021 05:03) (67 - 97)  BP: 124/87 (29 Jul 2021 05:03) (119/71 - 136/66)  BP(mean): 79 (28 Jul 2021 19:07) (72 - 85)  RR: 18 (29 Jul 2021 05:03) (13 - 20)  SpO2: 96% (29 Jul 2021 05:03) (96% - 100%)    PHYSICAL EXAMINATION:  GENERAL: NAD, well built  HEAD:  Atraumatic, Normocephalic  EYES:  conjunctiva and sclera clear  NECK: Supple, No JVD, Normal thyroid  CHEST/LUNG: Clear to auscultation. Clear to percussion bilaterally; No rales, rhonchi, wheezing, or rubs  HEART: Regular rate and rhythm; No murmurs, rubs, or gallops  ABDOMEN: Soft, Nontender, Nondistended; Bowel sounds present, no pain or masses on palpation  NERVOUS SYSTEM:  Alert & Oriented X3  : voiding well  EXTREMITIES: (+) R foot bandage, 2+ Peripheral Pulses, No clubbing, cyanosis, or edema  SKIN: warm dry                          11.7   8.37  )-----------( 268      ( 29 Jul 2021 07:18 )             35.1     07-29    139  |  103  |  18  ----------------------------<  156<H>  3.8   |  25  |  1.13    Ca    9.3      29 Jul 2021 07:18  Phos  3.8     07-29  Mg     1.9     07-29    TPro  7.2  /  Alb  3.2<L>  /  TBili  0.4  /  DBili  x   /  AST  15  /  ALT  20  /  AlkPhos  88  07-28    LIVER FUNCTIONS - ( 28 Jul 2021 06:19 )  Alb: 3.2 g/dL / Pro: 7.2 g/dL / ALK PHOS: 88 U/L / ALT: 20 U/L DA / AST: 15 U/L / GGT: x               PT/INR - ( 28 Jul 2021 06:19 )   PT: 13.5 sec;   INR: 1.14 ratio         PTT - ( 27 Jul 2021 13:51 )  PTT:33.2 sec    I&O's Summary          CAPILLARY BLOOD GLUCOSE      RADIOLOGY & ADDITIONAL TESTS:                   PGY-1 Progress Note discussed with attending    PAGER #: [[phone]] TILL 5:00 PM  PLEASE CONTACT ON CALL TEAM:  - On Call Team (Please refer to Юлия) FROM 5:00 PM - 8:30PM  - Nightfloat Team FROM 8:30 -7:30 AM    CHIEF COMPLAINT & BRIEF HOSPITAL COURSE:    Patient is a 70 yr female with PMH of diabetes, HTN, CAD with stent (2010) presented with right foot pain after stepping on a sewing needle last week (7/21/21). She tried to remove it but was not able to remove it entirely. She reports visiting [facility] ED yesterday and had an x-ray showing a foreign body in the right first toe. She was told to visit a different ED as they could not get the needle out. She was prescribed antibiotics at [place] yesterday (Clindamycin), Patient denies any headache, dizziness, chest pain, palpitations, shortness of breath, abdominal pain, nausea/vomiting/diarrhea or any other acute complaint.     Admitted to [facility]. Seen by Podiatry (Dr. Boswell) and had surgical procedure for foreign body removal (7/28/21). Surgical note found half-retained needle in tendon. Glucose was also monitored and insulin was adjusted accordingly. Had elevated TSH. Monitor thyroid levels as outpatient. PT consult and Nutrition consult done.    INTERVAL HPI/OVERNIGHT EVENTS:     Pt. examined at bedside, awake, alert and stable. S/p removal of foreign body, R great toe. Complains of 9/10 pain  at the surgical site. No bleeding, discharge, swelling. No significant events overnight.    REVIEW OF SYSTEMS:  CONSTITUTIONAL: No fever, weight loss, or fatigue  RESPIRATORY: No cough, wheezing, chills or hemoptysis; No shortness of breath  CARDIOVASCULAR: No chest pain, palpitations, dizziness, or leg swelling  GASTROINTESTINAL: No abdominal pain. No nausea, vomiting, or hematemesis; No diarrhea or constipation. No melena or hematochezia.  GENITOURINARY: No dysuria or hematuria, urinary frequency  NEUROLOGICAL: No headaches, memory loss, loss of strength, numbness, or tremors  SKIN: No itching, burning, rashes, or lesions  EXTREMITIES: (+) R foot pain (s/p foreign body removal)    MEDICATIONS  (STANDING):  aspirin enteric coated 81 milliGRAM(s) Oral daily  ceFAZolin   IVPB 500 milliGRAM(s) IV Intermittent every 8 hours  enoxaparin Injectable 40 milliGRAM(s) SubCutaneous daily  insulin glargine Injectable (LANTUS) 12 Unit(s) SubCutaneous at bedtime  insulin lispro (ADMELOG) corrective regimen sliding scale   SubCutaneous three times a day before meals  lisinopril 40 milliGRAM(s) Oral daily  metoprolol succinate ER 25 milliGRAM(s) Oral daily  simvastatin 20 milliGRAM(s) Oral at bedtime  sodium chloride 0.9%. 1000 milliLiter(s) (75 mL/Hr) IV Continuous <Continuous>    MEDICATIONS  (PRN):  acetaminophen   Tablet .. 650 milliGRAM(s) Oral every 6 hours PRN Temp greater or equal to 38.5C (101.3F), Mild Pain (1 - 3)  gabapentin 300 milliGRAM(s) Oral daily PRN leg pain  melatonin 3 milliGRAM(s) Oral at bedtime PRN Insomnia  oxycodone    5 mG/acetaminophen 325 mG 1 Tablet(s) Oral every 6 hours PRN Severe Pain (7 - 10)      Vital Signs Last 24 Hrs  T(C): 36.9 (29 Jul 2021 05:03), Max: 36.9 (29 Jul 2021 05:03)  T(F): 98.4 (29 Jul 2021 05:03), Max: 98.4 (29 Jul 2021 05:03)  HR: 89 (29 Jul 2021 05:03) (67 - 97)  BP: 124/87 (29 Jul 2021 05:03) (119/71 - 136/66)  BP(mean): 79 (28 Jul 2021 19:07) (72 - 85)  RR: 18 (29 Jul 2021 05:03) (13 - 20)  SpO2: 96% (29 Jul 2021 05:03) (96% - 100%)    PHYSICAL EXAMINATION:  GENERAL: NAD, well built  HEAD:  Atraumatic, Normocephalic  EYES:  conjunctiva and sclera clear  NECK: Supple, No JVD, Normal thyroid  CHEST/LUNG: Clear to auscultation. Clear to percussion bilaterally; No rales, rhonchi, wheezing, or rubs  HEART: Regular rate and rhythm; No murmurs, rubs, or gallops  ABDOMEN: Soft, Nontender, Nondistended; Bowel sounds present, no pain or masses on palpation  NERVOUS SYSTEM:  Alert & Oriented X3  : voiding well  EXTREMITIES: (+) R foot bandage, 2+ Peripheral Pulses, No clubbing, cyanosis, or edema  SKIN: warm dry                          11.7   8.37  )-----------( 268      ( 29 Jul 2021 07:18 )             35.1     07-29    139  |  103  |  18  ----------------------------<  156<H>  3.8   |  25  |  1.13    Ca    9.3      29 Jul 2021 07:18  Phos  3.8     07-29  Mg     1.9     07-29    TPro  7.2  /  Alb  3.2<L>  /  TBili  0.4  /  DBili  x   /  AST  15  /  ALT  20  /  AlkPhos  88  07-28    LIVER FUNCTIONS - ( 28 Jul 2021 06:19 )  Alb: 3.2 g/dL / Pro: 7.2 g/dL / ALK PHOS: 88 U/L / ALT: 20 U/L DA / AST: 15 U/L / GGT: x               PT/INR - ( 28 Jul 2021 06:19 )   PT: 13.5 sec;   INR: 1.14 ratio         PTT - ( 27 Jul 2021 13:51 )  PTT:33.2 sec    I&O's Summary          CAPILLARY BLOOD GLUCOSE      RADIOLOGY & ADDITIONAL TESTS:

## 2021-10-06 NOTE — CONSULT NOTE ADULT - ASSESSMENT
Assessment  Foreign body right foot       Plan  Patient evaluated and chart created  Xray of the right foot reviewed - pending final read  Foreign body noted on the right hallux - with distal tip of the needle penetrating the phalanx   A verbal consent was obtained   Injected 20 cc of lidocaine 1% plain in a cheng block fashion to the right hallux   Attempted to remove the needle but was unsuccessful   Sutured the incision site with 4-0 polypropylene   Spoke with ED for antibiotics   Plan for removal of foreign body in the OR tomorrow (7/28) at 3:30 PM with Dr. Boswell  Request medical clearance   Covid negative 7/27  NPO after midnight ordered  Discussed with Dr. Boswell      8

## 2022-02-17 VITALS
SYSTOLIC BLOOD PRESSURE: 168 MMHG | HEART RATE: 77 BPM | HEIGHT: 64 IN | RESPIRATION RATE: 15 BRPM | DIASTOLIC BLOOD PRESSURE: 78 MMHG | TEMPERATURE: 98 F | WEIGHT: 156.09 LBS | OXYGEN SATURATION: 98 %

## 2022-02-17 RX ORDER — CHLORHEXIDINE GLUCONATE 213 G/1000ML
1 SOLUTION TOPICAL ONCE
Refills: 0 | Status: DISCONTINUED | OUTPATIENT
Start: 2022-03-04 | End: 2022-03-18

## 2022-02-17 NOTE — H&P ADULT - ASSESSMENT
72 y/o Kazakh speaking F with FHx of CAD, PMH of HTN, HLD, IDDM, CAD s/p PCI @[facility] in 2012: ARMIDA X 2 LAD with residual 60 % OM2 and 60 % RPDA; most recent cardiac cath @ [facility] 2019: no stents placed at that time as per patient), chronic b/l shoulder pain, primary hyperparathyroidism who initially presented to her new PCP for follow up who ordered a NST for her and was then referred to Dr. Dockery. In light of patient’s risk factors, CCS class 2 sx and abnormal NST; pt. is now referred for cardiac cath with possible intervention.    -H/H = ____. Pt denies BRBPR, hematuria, hematochezia, melena. Pt loaded 325mg ASA x1 and Plavix 600mg x1  -Cr = _____. EF normal. Euvolemic on exam. IVF @ 75cc/hr started pre procedure  -Type of sedation: moderate  -Candidate for sedation: yes     Risks & benefits of procedure and alternative therapy have been explained to the patient including but not limited to: allergic reaction, bleeding w/possible need for blood transfusion, infection, renal and vascular compromise, limb damage, arrhythmia, stroke, vessel dissection/perforation, Myocardial infarction, emergent CABG. Informed consent obtained and in chart.  70 y/o Khmer speaking F with FHx of CAD, PMH of HTN, HLD, IDDM, CAD s/p PCI @[facility] in 2012: ARMIDA X 2 LAD with residual 60 % OM2 and 60 % RPDA; most recent cardiac cath @ [facility] 2019: no stents placed at that time as per patient), chronic b/l shoulder pain, primary hyperparathyroidism who initially presented to her new PCP for follow up who ordered a NST for her and was then referred to Dr. Dockery. In light of patient’s risk factors, CCS class 2 sx and abnormal NST; pt. is now referred for cardiac cath with possible intervention.    -H/H = 11.2/35.4. Pt denies BRBPR, hematuria, hematochezia, melena. Pt compliant with home 81mg ASA however did not take today. Will load with 81mg ASA x1 and 600mg Plavix x1 pre cath  -Cr = _____. EF normal. Euvolemic on exam. IVF with 250cc bolus x1 pre cath per protocol   -Type of sedation: moderate  -Candidate for sedation: yes     Risks & benefits of procedure and alternative therapy have been explained to the patient including but not limited to: allergic reaction, bleeding w/possible need for blood transfusion, infection, renal and vascular compromise, limb damage, arrhythmia, stroke, vessel dissection/perforation, Myocardial infarction, emergent CABG. Informed consent obtained and in chart.  70 y/o Turkish speaking F with FHx of CAD, PMH of HTN, HLD, IDDM, CAD s/p PCI @[facility] in 2012: ARMIDA X 2 LAD with residual 60 % OM2 and 60 % RPDA; most recent cardiac cath @ [facility] 2019: no stents placed at that time as per patient), chronic b/l shoulder pain, primary hyperparathyroidism who initially presented to her new PCP for follow up who ordered a NST for her and was then referred to Dr. Dockery. In light of patient’s risk factors, CCS class 2 sx and abnormal NST; pt. is now referred for cardiac cath with possible intervention.    -H/H = 11.2/35.4. Pt denies BRBPR, hematuria, hematochezia, melena. Pt compliant with home 81mg ASA however did not take today. Will load with 81mg ASA x1 and 600mg Plavix x1 pre cath  -Cr = 1.17. EF normal. Euvolemic on exam. IVF with 250cc bolus x1 pre cath per protocol   -Type of sedation: moderate  -Candidate for sedation: yes     Risks & benefits of procedure and alternative therapy have been explained to the patient including but not limited to: allergic reaction, bleeding w/possible need for blood transfusion, infection, renal and vascular compromise, limb damage, arrhythmia, stroke, vessel dissection/perforation, Myocardial infarction, emergent CABG. Informed consent obtained and in chart.

## 2022-02-17 NOTE — H&P ADULT - NECK DETAILS
Medication refilled per protocol.      Requested Prescriptions     Signed Prescriptions Disp Refills   • LOSARTAN POTASSIUM-HCTZ 100-12.5 MG Oral Tab 45 tablet 0     Sig: TAKE 1/2 TABLET EVERY DAY     Authorizing Provider: Lance Clayton     Ordering User:
normal/supple/no JVD

## 2022-02-17 NOTE — H&P ADULT - BP NONINVASIVE DIASTOLIC (MM HG)
Impression: Primary iridocyclitis, right eye: H20.011. Plan: Discussed diagnosis in detail with patient. Advised patient of condition. Will continue to observe condition and or symptoms. Start Lotemax gel 1gtt OD QID. Call if worse. 78

## 2022-02-17 NOTE — H&P ADULT - HISTORY OF PRESENT ILLNESS
Cardiologist: Dr. Dockery  COVID: 2/22/22 @   Pharmacy: [facility], [address]  Escort:  daughter    Verify Meds !!     History obtained with help of Moldovan Language Line ID # [id number]  72 y/o  Moldovan speaking F with FHx of CAD, PMH of HTN, HLD, DM, CAD s/p cardiac Cath 2012 @ [facility] 2012: ARMIDA X 2 LAD with residual 60 % OM2 and 60 % RPDA; most recent cardiac cath @ [facility] 2019: no stents placed at that time as per patient ), chronic b/l shoulder pain, primary hyperparathyroidism who initially presented to her new PCP for follow up who ordered a NST for her and was then referred to Dr. Dockery. Pt. reports general fatigue/weakness since the pandemic; she  Denies any CP, SOB, dizziness, palpitation, N/V, LE edema, PND/orthopnea, N/V, syncope. NST 2/7/22 revealed small sized reversible myocardial perfusion defect of inferior and inferolateral wall with mild ischemia; post stress EF 91 %; 1.5 mm ST depression in inferolateral leads; test stopped 2/2 fatigue/dyspnea.   ECHO 2/7/22 revealed EF 65 %.  In light of patient’s risk factors, CCS class 2 sx and abnormal NST; pt. is now referred for cardiac cath with possible intervention.   Last EGD/colonoscpopy 10 years ago was normal as per patient.    Cardiologist: Dr. Dockery  COVID: 3/1 @[facility]  Pharmacy: [facility], [address]  Escort: Daughter  *Confirm meds on arrival  History obtained with help of Sinhala Language Line ID # [id number]  72 y/o Sinhala speaking F with FHx of CAD, PMH of HTN, HLD, IDDM, CAD s/p PCI @[facility] in 2012: ARMIDA X 2 LAD with residual 60 % OM2 and 60 % RPDA; most recent cardiac cath @ [facility] 2019: no stents placed at that time as per patient), chronic b/l shoulder pain, primary hyperparathyroidism who initially presented to her new PCP for follow up who ordered a NST for her and was then referred to Dr. Dockery. Pt. reports general fatigue/weakness since the pandemic; she  Denies any CP, SOB, dizziness, palpitation, N/V, LE edema, PND/orthopnea, N/V, syncope. NST 2/7/22 revealed small sized reversible myocardial perfusion defect of inferior and inferolateral wall with mild ischemia; post stress EF 91%; 1.5 mm ST depression in inferolateral leads; test stopped 2/2 fatigue/dyspnea. ECHO 2/7/22 revealed EF 65 %.  In light of patient’s risk factors, CCS class 2 sx and abnormal NST; pt. is now referred for cardiac cath with possible intervention.  Last EGD/colonoscpopy 10 years ago was normal as per patient.    Cardiologist: Dr. Dockery  COVID: 3/1 @[facility] -- Negative in HIE  Pharmacy: [facility], [address]   Escort: Daughter  70 y/o Kyrgyz speaking F with FHx of CAD, PMH of HTN, HLD, IDDM, CAD s/p PCI @[facility] in 2012: ARMIDA X 2 LAD with residual 60 % OM2 and 60 % RPDA; most recent cardiac cath @ [facility] 2019: no stents placed at that time as per patient), chronic b/l shoulder pain, primary hyperparathyroidism who initially presented to her new PCP for follow up who ordered a NST for her and was then referred to Dr. Dockery. Pt. reports general fatigue/weakness since the pandemic; she  Denies any CP, SOB, dizziness, palpitation, N/V, LE edema, PND/orthopnea, N/V, syncope. NST 2/7/22 revealed small sized reversible myocardial perfusion defect of inferior and inferolateral wall with mild ischemia; post stress EF 91%; 1.5 mm ST depression in inferolateral leads; test stopped 2/2 fatigue/dyspnea. ECHO 2/7/22 revealed EF 65 %.  In light of patient’s risk factors, CCS class 2 sx and abnormal NST; pt. is now referred for cardiac cath with possible intervention.  Last EGD/colonoscpopy 10 years ago was normal as per patient.    Cardiologist: Dr. Dockery  COVID: 3/1 @[facility] -- Negative in HIE  Pharmacy: [facility], [address] - Meds verified with patient bottles  Escort: Daughter  70 y/o Burkinan speaking F with FHx of CAD, PMH of HTN, HLD, IDDM, CAD s/p PCI @[facility] in 2012: ARMIDA X 2 LAD with residual 60 % OM2 and 60 % RPDA; most recent cardiac cath @ [facility] 2019: no stents placed at that time as per patient), chronic b/l shoulder pain, primary hyperparathyroidism who initially presented to her new PCP for follow up who ordered a NST for her and was then referred to Dr. Dockery. Pt. reports general fatigue/weakness since the pandemic; she  Denies any CP, SOB, dizziness, palpitation, N/V, LE edema, PND/orthopnea, N/V, syncope. NST 2/7/22 revealed small sized reversible myocardial perfusion defect of inferior and inferolateral wall with mild ischemia; post stress EF 91%; 1.5 mm ST depression in inferolateral leads; test stopped 2/2 fatigue/dyspnea. ECHO 2/7/22 revealed EF 65 %.  In light of patient’s risk factors, CCS class 2 sx and abnormal NST; pt. is now referred for cardiac cath with possible intervention.  Last EGD/colonoscpopy 10 years ago was normal as per patient.

## 2022-02-17 NOTE — H&P ADULT - NSICDXPASTSURGICALHX_GEN_ALL_CORE_FT
PAST SURGICAL HISTORY:  History of hysterectomy for fibroids 25 years ago    History of pilonidal cyst 2000    S/P T&A childhood

## 2022-03-04 ENCOUNTER — OUTPATIENT (OUTPATIENT)
Dept: OUTPATIENT SERVICES | Facility: HOSPITAL | Age: 72
LOS: 1 days | Discharge: HOME | End: 2022-03-04
Payer: MEDICARE

## 2022-03-04 DIAGNOSIS — Z90.710 ACQUIRED ABSENCE OF BOTH CERVIX AND UTERUS: Chronic | ICD-10-CM

## 2022-03-04 LAB
A1C WITH ESTIMATED AVERAGE GLUCOSE RESULT: 8.2 % — HIGH (ref 4–5.6)
ALBUMIN SERPL ELPH-MCNC: 4.4 G/DL — SIGNIFICANT CHANGE UP (ref 3.3–5)
ALP SERPL-CCNC: 156 U/L — HIGH (ref 40–120)
ALT FLD-CCNC: 27 U/L — SIGNIFICANT CHANGE UP (ref 10–45)
ANION GAP SERPL CALC-SCNC: 12 MMOL/L — SIGNIFICANT CHANGE UP (ref 5–17)
APTT BLD: 30.4 SEC — SIGNIFICANT CHANGE UP (ref 27.5–35.5)
AST SERPL-CCNC: 29 U/L — SIGNIFICANT CHANGE UP (ref 10–40)
BASOPHILS # BLD AUTO: 0.04 K/UL — SIGNIFICANT CHANGE UP (ref 0–0.2)
BASOPHILS NFR BLD AUTO: 0.5 % — SIGNIFICANT CHANGE UP (ref 0–2)
BILIRUB DIRECT SERPL-MCNC: 0.2 MG/DL — SIGNIFICANT CHANGE UP (ref 0–0.3)
BILIRUB INDIRECT FLD-MCNC: 0.2 MG/DL — SIGNIFICANT CHANGE UP (ref 0.2–1)
BILIRUB SERPL-MCNC: 0.4 MG/DL — SIGNIFICANT CHANGE UP (ref 0.2–1.2)
BUN SERPL-MCNC: 25 MG/DL — HIGH (ref 7–23)
CALCIUM SERPL-MCNC: 9.8 MG/DL — SIGNIFICANT CHANGE UP (ref 8.4–10.5)
CHLORIDE SERPL-SCNC: 103 MMOL/L — SIGNIFICANT CHANGE UP (ref 96–108)
CHOLEST SERPL-MCNC: 133 MG/DL — SIGNIFICANT CHANGE UP
CO2 SERPL-SCNC: 26 MMOL/L — SIGNIFICANT CHANGE UP (ref 22–31)
CREAT SERPL-MCNC: 1.17 MG/DL — SIGNIFICANT CHANGE UP (ref 0.5–1.3)
EGFR: 50 ML/MIN/1.73M2 — LOW
EOSINOPHIL # BLD AUTO: 0.27 K/UL — SIGNIFICANT CHANGE UP (ref 0–0.5)
EOSINOPHIL NFR BLD AUTO: 3.3 % — SIGNIFICANT CHANGE UP (ref 0–6)
ESTIMATED AVERAGE GLUCOSE: 189 MG/DL — HIGH (ref 68–114)
GLUCOSE BLDC GLUCOMTR-MCNC: 178 MG/DL — HIGH (ref 70–99)
GLUCOSE BLDC GLUCOMTR-MCNC: 188 MG/DL — HIGH (ref 70–99)
GLUCOSE SERPL-MCNC: 185 MG/DL — HIGH (ref 70–99)
HCT VFR BLD CALC: 35.4 % — SIGNIFICANT CHANGE UP (ref 34.5–45)
HDLC SERPL-MCNC: 49 MG/DL — LOW
HGB BLD-MCNC: 11.2 G/DL — LOW (ref 11.5–15.5)
IMM GRANULOCYTES NFR BLD AUTO: 0.2 % — SIGNIFICANT CHANGE UP (ref 0–1.5)
INR BLD: 1.05 — SIGNIFICANT CHANGE UP (ref 0.88–1.16)
ISTAT INR: 1 — SIGNIFICANT CHANGE UP (ref 0.88–1.16)
ISTAT PT: 12.4 SEC — SIGNIFICANT CHANGE UP (ref 10–12.9)
LIPID PNL WITH DIRECT LDL SERPL: 57 MG/DL — SIGNIFICANT CHANGE UP
LYMPHOCYTES # BLD AUTO: 2.31 K/UL — SIGNIFICANT CHANGE UP (ref 1–3.3)
LYMPHOCYTES # BLD AUTO: 28.7 % — SIGNIFICANT CHANGE UP (ref 13–44)
MCHC RBC-ENTMCNC: 29.8 PG — SIGNIFICANT CHANGE UP (ref 27–34)
MCHC RBC-ENTMCNC: 31.6 GM/DL — LOW (ref 32–36)
MCV RBC AUTO: 94.1 FL — SIGNIFICANT CHANGE UP (ref 80–100)
MONOCYTES # BLD AUTO: 0.58 K/UL — SIGNIFICANT CHANGE UP (ref 0–0.9)
MONOCYTES NFR BLD AUTO: 7.2 % — SIGNIFICANT CHANGE UP (ref 2–14)
NEUTROPHILS # BLD AUTO: 4.84 K/UL — SIGNIFICANT CHANGE UP (ref 1.8–7.4)
NEUTROPHILS NFR BLD AUTO: 60.1 % — SIGNIFICANT CHANGE UP (ref 43–77)
NON HDL CHOLESTEROL: 84 MG/DL — SIGNIFICANT CHANGE UP
NRBC # BLD: 0 /100 WBCS — SIGNIFICANT CHANGE UP (ref 0–0)
PLATELET # BLD AUTO: 262 K/UL — SIGNIFICANT CHANGE UP (ref 150–400)
POCT ISTAT CREATININE: 1.1 MG/DL — SIGNIFICANT CHANGE UP (ref 0.5–1.3)
POTASSIUM SERPL-MCNC: 3.8 MMOL/L — SIGNIFICANT CHANGE UP (ref 3.5–5.3)
POTASSIUM SERPL-SCNC: 3.8 MMOL/L — SIGNIFICANT CHANGE UP (ref 3.5–5.3)
PROT SERPL-MCNC: 7.1 G/DL — SIGNIFICANT CHANGE UP (ref 6–8.3)
PROTHROM AB SERPL-ACNC: 12.5 SEC — SIGNIFICANT CHANGE UP (ref 10.5–13.4)
RBC # BLD: 3.76 M/UL — LOW (ref 3.8–5.2)
RBC # FLD: 13.2 % — SIGNIFICANT CHANGE UP (ref 10.3–14.5)
SODIUM SERPL-SCNC: 141 MMOL/L — SIGNIFICANT CHANGE UP (ref 135–145)
TRIGL SERPL-MCNC: 133 MG/DL — SIGNIFICANT CHANGE UP
WBC # BLD: 8.06 K/UL — SIGNIFICANT CHANGE UP (ref 3.8–10.5)
WBC # FLD AUTO: 8.06 K/UL — SIGNIFICANT CHANGE UP (ref 3.8–10.5)

## 2022-03-04 PROCEDURE — 93458 L HRT ARTERY/VENTRICLE ANGIO: CPT | Mod: 26,59

## 2022-03-04 PROCEDURE — 82248 BILIRUBIN DIRECT: CPT

## 2022-03-04 PROCEDURE — 93005 ELECTROCARDIOGRAM TRACING: CPT

## 2022-03-04 PROCEDURE — 99152 MOD SED SAME PHYS/QHP 5/>YRS: CPT

## 2022-03-04 PROCEDURE — 85730 THROMBOPLASTIN TIME PARTIAL: CPT

## 2022-03-04 PROCEDURE — 82962 GLUCOSE BLOOD TEST: CPT

## 2022-03-04 PROCEDURE — 83036 HEMOGLOBIN GLYCOSYLATED A1C: CPT

## 2022-03-04 PROCEDURE — 93010 ELECTROCARDIOGRAM REPORT: CPT

## 2022-03-04 PROCEDURE — 80053 COMPREHEN METABOLIC PANEL: CPT

## 2022-03-04 PROCEDURE — 82565 ASSAY OF CREATININE: CPT

## 2022-03-04 PROCEDURE — 80061 LIPID PANEL: CPT

## 2022-03-04 PROCEDURE — C1894: CPT

## 2022-03-04 PROCEDURE — C1887: CPT

## 2022-03-04 PROCEDURE — C1769: CPT

## 2022-03-04 PROCEDURE — 93458 L HRT ARTERY/VENTRICLE ANGIO: CPT

## 2022-03-04 PROCEDURE — 85610 PROTHROMBIN TIME: CPT

## 2022-03-04 PROCEDURE — 85025 COMPLETE CBC W/AUTO DIFF WBC: CPT

## 2022-03-04 PROCEDURE — 36415 COLL VENOUS BLD VENIPUNCTURE: CPT

## 2022-03-04 RX ORDER — METOPROLOL TARTRATE 50 MG
1 TABLET ORAL
Qty: 0 | Refills: 0 | DISCHARGE

## 2022-03-04 RX ORDER — CLOPIDOGREL BISULFATE 75 MG/1
600 TABLET, FILM COATED ORAL ONCE
Refills: 0 | Status: COMPLETED | OUTPATIENT
Start: 2022-03-04 | End: 2022-03-04

## 2022-03-04 RX ORDER — METFORMIN HYDROCHLORIDE 850 MG/1
1 TABLET ORAL
Qty: 0 | Refills: 0 | DISCHARGE

## 2022-03-04 RX ORDER — ASPIRIN/CALCIUM CARB/MAGNESIUM 324 MG
81 TABLET ORAL ONCE
Refills: 0 | Status: COMPLETED | OUTPATIENT
Start: 2022-03-04 | End: 2022-03-04

## 2022-03-04 RX ORDER — SODIUM CHLORIDE 9 MG/ML
500 INJECTION INTRAMUSCULAR; INTRAVENOUS; SUBCUTANEOUS
Refills: 0 | Status: DISCONTINUED | OUTPATIENT
Start: 2022-03-04 | End: 2022-03-18

## 2022-03-04 RX ORDER — ATORVASTATIN CALCIUM 80 MG/1
1 TABLET, FILM COATED ORAL
Qty: 0 | Refills: 0 | DISCHARGE

## 2022-03-04 RX ORDER — RAMIPRIL 5 MG
1 CAPSULE ORAL
Qty: 0 | Refills: 0 | DISCHARGE

## 2022-03-04 RX ORDER — ALENDRONATE SODIUM 70 MG/1
1 TABLET ORAL
Qty: 0 | Refills: 0 | DISCHARGE

## 2022-03-04 RX ORDER — SODIUM CHLORIDE 9 MG/ML
250 INJECTION INTRAMUSCULAR; INTRAVENOUS; SUBCUTANEOUS ONCE
Refills: 0 | Status: COMPLETED | OUTPATIENT
Start: 2022-03-04 | End: 2022-03-04

## 2022-03-04 RX ORDER — INSULIN LISPRO 100/ML
8 VIAL (ML) SUBCUTANEOUS
Qty: 0 | Refills: 0 | DISCHARGE

## 2022-03-04 RX ORDER — ASPIRIN/CALCIUM CARB/MAGNESIUM 324 MG
1 TABLET ORAL
Qty: 0 | Refills: 0 | DISCHARGE

## 2022-03-04 RX ORDER — INSULIN GLARGINE 100 [IU]/ML
24 INJECTION, SOLUTION SUBCUTANEOUS
Qty: 0 | Refills: 0 | DISCHARGE

## 2022-03-04 RX ADMIN — SODIUM CHLORIDE 500 MILLILITER(S): 9 INJECTION INTRAMUSCULAR; INTRAVENOUS; SUBCUTANEOUS at 07:52

## 2022-03-04 RX ADMIN — CLOPIDOGREL BISULFATE 600 MILLIGRAM(S): 75 TABLET, FILM COATED ORAL at 07:51

## 2022-03-04 RX ADMIN — Medication 81 MILLIGRAM(S): at 07:51

## 2022-03-04 NOTE — PROGRESS NOTE ADULT - SUBJECTIVE AND OBJECTIVE BOX
Interventional Cardiology [facility] Discharge Note    Patient without complaints. Ambulated and voided without difficulties    Afebrile, VSS      Ext:   Right Radial: no hematoma or bleeding, dressing C/D/I  Pulses: intact RAD to baseline     A/P:    72 y/o Maltese speaking F with FHx of CAD, PMH of HTN, HLD, IDDM, CAD s/p PCI @[facility] in 2012: ARMIDA X 2 LAD with residual 60 % OM2 and 60 % RPDA; most recent cardiac cath @ [facility] 2019: no stents placed at that time as per patient), chronic b/l shoulder pain, primary hyperparathyroidism who initially presented to her new PCP for follow up who ordered a NST for her and was then referred to Dr. Dockery. In light of patient’s risk factors, CCS class 2 sx and abnormal NST; pt. is now referred for cardiac cath with possible intervention.  She is now s/p dx cardiac cath revealing calcified non-obstructive CAD (LM short, large size; LAD large size, ostial/prox 30% tubular, prox stent 20% ISR, mid/distal mild diffuse; LCx large size, OM1 prox 30% tubular, RCA large dominant, mid 40% tubular, PDA 40% diffuse). EF 65%. R radial access (TR band removed without complication).     -continue medical management  -instructed to hold metformin x48 hrs  -Stable for discharge as per attending Dr. Dockery after bed rest, pt voids, wrist stable and 30 minutes of ambulation.  -Follow-up with Cardiologist Dr. Dockery within 1-2 weeks  -Discharge forms signed and copies in chart   -Discharge Order Entered

## 2022-03-10 DIAGNOSIS — R94.39 ABNORMAL RESULT OF OTHER CARDIOVASCULAR FUNCTION STUDY: ICD-10-CM

## 2022-03-10 DIAGNOSIS — Z95.5 PRESENCE OF CORONARY ANGIOPLASTY IMPLANT AND GRAFT: ICD-10-CM

## 2022-03-10 DIAGNOSIS — I25.10 ATHEROSCLEROTIC HEART DISEASE OF NATIVE CORONARY ARTERY WITHOUT ANGINA PECTORIS: ICD-10-CM

## 2022-03-10 DIAGNOSIS — I25.84 CORONARY ATHEROSCLEROSIS DUE TO CALCIFIED CORONARY LESION: ICD-10-CM

## 2022-06-28 RX ORDER — MELOXICAM 15 MG/1
1 TABLET ORAL
Qty: 0 | Refills: 0 | DISCHARGE

## 2022-06-28 RX ORDER — METOPROLOL TARTRATE 50 MG
1 TABLET ORAL
Qty: 0 | Refills: 0 | DISCHARGE

## 2022-06-28 RX ORDER — SIMVASTATIN 20 MG/1
1 TABLET, FILM COATED ORAL
Qty: 0 | Refills: 0 | DISCHARGE

## 2022-06-28 RX ORDER — ASPIRIN/CALCIUM CARB/MAGNESIUM 324 MG
1 TABLET ORAL
Qty: 0 | Refills: 0 | DISCHARGE

## 2022-06-28 RX ORDER — GABAPENTIN 400 MG/1
1 CAPSULE ORAL
Qty: 0 | Refills: 0 | DISCHARGE

## 2022-06-28 RX ORDER — INSULIN ASPART 100 [IU]/ML
8 INJECTION, SOLUTION SUBCUTANEOUS
Qty: 0 | Refills: 0 | DISCHARGE

## 2022-06-28 RX ORDER — CHLORTHALIDONE 50 MG
1 TABLET ORAL
Qty: 0 | Refills: 0 | DISCHARGE

## 2022-06-28 RX ORDER — RAMIPRIL 5 MG
1 CAPSULE ORAL
Qty: 0 | Refills: 0 | DISCHARGE

## 2022-06-28 RX ORDER — INSULIN GLARGINE 100 [IU]/ML
24 INJECTION, SOLUTION SUBCUTANEOUS
Qty: 0 | Refills: 0 | DISCHARGE

## 2022-06-28 RX ORDER — METFORMIN HYDROCHLORIDE 850 MG/1
1 TABLET ORAL
Qty: 0 | Refills: 0 | DISCHARGE

## 2023-04-14 NOTE — H&P ADULT - ADDITIONAL PE
EKG is not a problem.  We can order EKG and she can just come in for a ECG and we will then contact her with results   ASA II, Mallampati II  ECG:

## 2024-02-15 NOTE — PROGRESS NOTE ADULT - ATTENDING COMMENTS
Walker
Statement Selected
69 y/o female with a PMH of DM type 2, HTN, CAD s/p stent 11 years ago, who presented with right foot pain after stepping on a sewing needle on Wednesday and admitted for foreign body removal with Podiatry in the OR this afternoon. Continue IV Ancef perioperatively x 1 more day. No further pre-operative testing indicated, she is medically optimized to proceed to the OR. Is NPO, can resume diet and home lantus/lispro post-operatively. Continue home Metoprolol but continue to hold home Ramipril and Chlorthalidone, can resume Ramipril tomorrow if BP and Cr stable. Continue home Aspirin and Statin. Hold chemical DVT ppx until after procedure, continue SCDs for now. Will need PT eval post-operatively. Possible discharge in 1-2 days pending post-op clinical improvement and PT evaluation.
71 y/o female with a PMH of DM type 2, HTN, CAD s/p stent 11 years ago, who presented with right foot pain after stepping on a sewing needle on Wednesday and admitted for foreign body removal with Podiatry, went to the OR on 7/28 with successful removal. Was on IV Ancef perioperatively, podiatry recommended Augmentin to complete a total of 10 days. Is now back on diet, therefore resume home insulin regimen. Continue home Metoprolol and Lisinopril in place of Ramipril while admitted, continue to hold Chlorthalidone for a few days. Continue home Aspirin and Statin. PT recommended home with walker. Will discharge home with outpatient podiatry follow up.